# Patient Record
Sex: FEMALE | Race: BLACK OR AFRICAN AMERICAN | NOT HISPANIC OR LATINO | URBAN - METROPOLITAN AREA
[De-identification: names, ages, dates, MRNs, and addresses within clinical notes are randomized per-mention and may not be internally consistent; named-entity substitution may affect disease eponyms.]

---

## 2022-03-04 ENCOUNTER — INPATIENT (INPATIENT)
Facility: HOSPITAL | Age: 77
LOS: 1 days | Discharge: HOME | End: 2022-03-06
Attending: INTERNAL MEDICINE | Admitting: INTERNAL MEDICINE
Payer: MEDICARE

## 2022-03-04 VITALS
HEIGHT: 66 IN | RESPIRATION RATE: 18 BRPM | DIASTOLIC BLOOD PRESSURE: 123 MMHG | OXYGEN SATURATION: 97 % | SYSTOLIC BLOOD PRESSURE: 211 MMHG | WEIGHT: 151.9 LBS | TEMPERATURE: 98 F | HEART RATE: 68 BPM

## 2022-03-04 LAB
ALBUMIN SERPL ELPH-MCNC: 3.6 G/DL — SIGNIFICANT CHANGE UP (ref 3.3–5)
ALP SERPL-CCNC: 70 U/L — SIGNIFICANT CHANGE UP (ref 40–120)
ALT FLD-CCNC: 18 U/L — SIGNIFICANT CHANGE UP (ref 12–78)
ANION GAP SERPL CALC-SCNC: 1 MMOL/L — LOW (ref 5–17)
APTT BLD: 32.8 SEC — SIGNIFICANT CHANGE UP (ref 27.5–35.5)
AST SERPL-CCNC: 32 U/L — SIGNIFICANT CHANGE UP (ref 15–37)
BASOPHILS # BLD AUTO: 0.05 K/UL — SIGNIFICANT CHANGE UP (ref 0–0.2)
BASOPHILS NFR BLD AUTO: 0.7 % — SIGNIFICANT CHANGE UP (ref 0–2)
BILIRUB SERPL-MCNC: 0.7 MG/DL — SIGNIFICANT CHANGE UP (ref 0.2–1.2)
BUN SERPL-MCNC: 10 MG/DL — SIGNIFICANT CHANGE UP (ref 7–23)
CALCIUM SERPL-MCNC: 9.3 MG/DL — SIGNIFICANT CHANGE UP (ref 8.5–10.1)
CHLORIDE SERPL-SCNC: 106 MMOL/L — SIGNIFICANT CHANGE UP (ref 96–108)
CO2 SERPL-SCNC: 30 MMOL/L — SIGNIFICANT CHANGE UP (ref 22–31)
CREAT SERPL-MCNC: 1.02 MG/DL — SIGNIFICANT CHANGE UP (ref 0.5–1.3)
EGFR: 57 ML/MIN/1.73M2 — LOW
EOSINOPHIL # BLD AUTO: 0.01 K/UL — SIGNIFICANT CHANGE UP (ref 0–0.5)
EOSINOPHIL NFR BLD AUTO: 0.1 % — SIGNIFICANT CHANGE UP (ref 0–6)
FLUAV AG NPH QL: SIGNIFICANT CHANGE UP
FLUBV AG NPH QL: SIGNIFICANT CHANGE UP
GLUCOSE BLDC GLUCOMTR-MCNC: 113 MG/DL — HIGH (ref 70–99)
GLUCOSE BLDC GLUCOMTR-MCNC: 52 MG/DL — CRITICAL LOW (ref 70–99)
GLUCOSE SERPL-MCNC: 112 MG/DL — HIGH (ref 70–99)
HCT VFR BLD CALC: 38.9 % — SIGNIFICANT CHANGE UP (ref 34.5–45)
HGB BLD-MCNC: 12.1 G/DL — SIGNIFICANT CHANGE UP (ref 11.5–15.5)
IMM GRANULOCYTES NFR BLD AUTO: 0.1 % — SIGNIFICANT CHANGE UP (ref 0–1.5)
INR BLD: 0.97 RATIO — SIGNIFICANT CHANGE UP (ref 0.88–1.16)
LYMPHOCYTES # BLD AUTO: 1.75 K/UL — SIGNIFICANT CHANGE UP (ref 1–3.3)
LYMPHOCYTES # BLD AUTO: 24.5 % — SIGNIFICANT CHANGE UP (ref 13–44)
MCHC RBC-ENTMCNC: 23.4 PG — LOW (ref 27–34)
MCHC RBC-ENTMCNC: 31.1 G/DL — LOW (ref 32–36)
MCV RBC AUTO: 75.4 FL — LOW (ref 80–100)
MONOCYTES # BLD AUTO: 0.46 K/UL — SIGNIFICANT CHANGE UP (ref 0–0.9)
MONOCYTES NFR BLD AUTO: 6.5 % — SIGNIFICANT CHANGE UP (ref 2–14)
NEUTROPHILS # BLD AUTO: 4.85 K/UL — SIGNIFICANT CHANGE UP (ref 1.8–7.4)
NEUTROPHILS NFR BLD AUTO: 68.1 % — SIGNIFICANT CHANGE UP (ref 43–77)
NRBC # BLD: 0 /100 WBCS — SIGNIFICANT CHANGE UP (ref 0–0)
PLATELET # BLD AUTO: 222 K/UL — SIGNIFICANT CHANGE UP (ref 150–400)
POTASSIUM SERPL-MCNC: 4.3 MMOL/L — SIGNIFICANT CHANGE UP (ref 3.5–5.3)
POTASSIUM SERPL-SCNC: 4.3 MMOL/L — SIGNIFICANT CHANGE UP (ref 3.5–5.3)
PROT SERPL-MCNC: 7.6 GM/DL — SIGNIFICANT CHANGE UP (ref 6–8.3)
PROTHROM AB SERPL-ACNC: 11.5 SEC — SIGNIFICANT CHANGE UP (ref 10.5–13.4)
RBC # BLD: 5.16 M/UL — SIGNIFICANT CHANGE UP (ref 3.8–5.2)
RBC # FLD: 14.4 % — SIGNIFICANT CHANGE UP (ref 10.3–14.5)
SARS-COV-2 RNA SPEC QL NAA+PROBE: SIGNIFICANT CHANGE UP
SODIUM SERPL-SCNC: 137 MMOL/L — SIGNIFICANT CHANGE UP (ref 135–145)
TROPONIN I, HIGH SENSITIVITY RESULT: 7.3 NG/L — SIGNIFICANT CHANGE UP
WBC # BLD: 7.13 K/UL — SIGNIFICANT CHANGE UP (ref 3.8–10.5)
WBC # FLD AUTO: 7.13 K/UL — SIGNIFICANT CHANGE UP (ref 3.8–10.5)

## 2022-03-04 PROCEDURE — 99223 1ST HOSP IP/OBS HIGH 75: CPT

## 2022-03-04 PROCEDURE — 70496 CT ANGIOGRAPHY HEAD: CPT | Mod: 26,MA

## 2022-03-04 PROCEDURE — 99284 EMERGENCY DEPT VISIT MOD MDM: CPT

## 2022-03-04 PROCEDURE — 70498 CT ANGIOGRAPHY NECK: CPT | Mod: 26,MA

## 2022-03-04 PROCEDURE — 93010 ELECTROCARDIOGRAM REPORT: CPT

## 2022-03-04 RX ORDER — AMLODIPINE BESYLATE 2.5 MG/1
10 TABLET ORAL ONCE
Refills: 0 | Status: COMPLETED | OUTPATIENT
Start: 2022-03-04 | End: 2022-03-04

## 2022-03-04 RX ORDER — LABETALOL HCL 100 MG
10 TABLET ORAL ONCE
Refills: 0 | Status: COMPLETED | OUTPATIENT
Start: 2022-03-04 | End: 2022-03-04

## 2022-03-04 RX ORDER — HYDROCHLOROTHIAZIDE 25 MG
25 TABLET ORAL DAILY
Refills: 0 | Status: DISCONTINUED | OUTPATIENT
Start: 2022-03-04 | End: 2022-03-06

## 2022-03-04 RX ORDER — ATORVASTATIN CALCIUM 80 MG/1
40 TABLET, FILM COATED ORAL AT BEDTIME
Refills: 0 | Status: DISCONTINUED | OUTPATIENT
Start: 2022-03-04 | End: 2022-03-06

## 2022-03-04 RX ORDER — ASPIRIN/CALCIUM CARB/MAGNESIUM 324 MG
81 TABLET ORAL DAILY
Refills: 0 | Status: DISCONTINUED | OUTPATIENT
Start: 2022-03-04 | End: 2022-03-06

## 2022-03-04 RX ORDER — HYDRALAZINE HCL 50 MG
50 TABLET ORAL THREE TIMES A DAY
Refills: 0 | Status: DISCONTINUED | OUTPATIENT
Start: 2022-03-04 | End: 2022-03-06

## 2022-03-04 RX ORDER — HYDRALAZINE HCL 50 MG
10 TABLET ORAL ONCE
Refills: 0 | Status: COMPLETED | OUTPATIENT
Start: 2022-03-04 | End: 2022-03-04

## 2022-03-04 RX ORDER — AMLODIPINE BESYLATE 2.5 MG/1
10 TABLET ORAL DAILY
Refills: 0 | Status: DISCONTINUED | OUTPATIENT
Start: 2022-03-04 | End: 2022-03-06

## 2022-03-04 RX ADMIN — AMLODIPINE BESYLATE 10 MILLIGRAM(S): 2.5 TABLET ORAL at 18:15

## 2022-03-04 RX ADMIN — Medication 10 MILLIGRAM(S): at 21:13

## 2022-03-04 RX ADMIN — Medication 50 MILLIGRAM(S): at 19:41

## 2022-03-04 RX ADMIN — ATORVASTATIN CALCIUM 40 MILLIGRAM(S): 80 TABLET, FILM COATED ORAL at 19:40

## 2022-03-04 RX ADMIN — Medication 10 MILLIGRAM(S): at 17:30

## 2022-03-04 RX ADMIN — Medication 81 MILLIGRAM(S): at 19:41

## 2022-03-04 NOTE — H&P ADULT - ASSESSMENT
76 year old female no sig PMH presents to the ER for pre-syncope. Patient reports waking up this morning, felt dizziness, stood up from bed, tried making it downstairs to her granddaughter to tell her she didn't feel well, lowered self to the ground, reporting near syncope. Denies hitting head, LOC or use of blood thinners.     Plan:  Admit to tele for near-syncope. BP elevated in ER, HTN urgency. Labs WNL. Takes no meds at home, HR 62 in ER.      Added Norvasc 10 mg daily and Hydralazine 50 mg tid along with HCTZ daily.  Cartoid dopplers ordered as CTA notes bilateral ICA stenosis.  Refuses to wait for   brain MRI, can be done as outpatient.     Eager for discharge Saturday AM.     76 year old female no sig PMH presents to the ER for pre-syncope. Patient reports waking up this morning, felt dizziness, stood up from bed, tried making it downstairs to her granddaughter to tell her she didn't feel well, lowered self to the ground, reporting near syncope. Denies hitting head, LOC or use of blood thinners.     Plan:  Admit to tele for near-syncope. BP elevated in ER, HTN urgency. Labs WNL. Takes no meds at home, HR 62 in ER.      Added Norvasc 10 mg daily and Hydralazine 50 mg tid along with HCTZ daily.  Cartoid dopplers ordered as CTA notes bilateral ICA stenosis.  CT head no acute infarcts or bleeds. Refuses to wait for brain MRI, can be done as outpatient.     Eager for discharge Saturday AM.

## 2022-03-04 NOTE — ED ADULT NURSE NOTE - OBJECTIVE STATEMENT
per  pt c/o blurry vision with dizziness for 2 days, pt states she had syncopal episode this morning. Pt denies taking any BP meds. Pt is axo4, denies chest pain.

## 2022-03-04 NOTE — H&P ADULT - NSHPLABSRESULTS_GEN_ALL_CORE
LABS:                        12.1   7.13  )-----------( 222      ( 04 Mar 2022 17:00 )             38.9     03-04    137  |  106  |  10  ----------------------------<  112<H>  4.3   |  30  |  1.02    Ca    9.3      04 Mar 2022 17:00    TPro  7.6  /  Alb  3.6  /  TBili  0.7  /  DBili  x   /  AST  32  /  ALT  18  /  AlkPhos  70  03-04    PT/INR - ( 04 Mar 2022 17:00 )   PT: 11.5 sec;   INR: 0.97 ratio         PTT - ( 04 Mar 2022 17:00 )  PTT:32.8 sec        RADIOLOGY & ADDITIONAL TESTS:

## 2022-03-04 NOTE — PATIENT PROFILE ADULT - FALL HARM RISK - ATTEMPT OOB
Juliet Veliz:  Fernandez has been having worsening depression.  She does not think the Celexa has been as effective lately.  I strongly recommend regular follow-up with your office.  Patient and father were agreeable.  Thanks for caring for her!  Austin.  Austin Patiño MD 1/26/2022 9:20 AM  
No

## 2022-03-04 NOTE — ED ADULT NURSE REASSESSMENT NOTE - NS ED NURSE REASSESS COMMENT FT1
pt oneil down to 44 per tele monitor tech. strip printed out. pt assessed and pt is asymptomatic. Hospitalist paged and Dr. Campo notified. Per Dr. Campo, pt likely had a pause due to Lopressor administration. Pt to have dose of hydralazine to bring down BP. Per Dr. Campo, pt stable and is ready to move to 2D. Updated report given to RN on 2D. Will continue to monitor.

## 2022-03-04 NOTE — ED PROVIDER NOTE - OBJECTIVE STATEMENT
76y Female with no sig PMHx presents to the ER for syncope. Patient reports waking up this morning, felt dizziness, stood up from bed, tried making it downstairs to her granddaughter to tell her she didn't feel well, lowered self to the ground, reporting near syncope. Denies hitting head, LOC or blood thinners. Reports R eye blurry vision without pain that began month ago. Denies chest pain, sob, abdominal pain, n/v/d, one sided weakness, slurred speech, facial droop.

## 2022-03-04 NOTE — PATIENT PROFILE ADULT - FALL HARM RISK - HARM RISK INTERVENTIONS

## 2022-03-04 NOTE — ED ADULT NURSE NOTE - CHIEF COMPLAINT QUOTE
per EMS pt c/o blurry vision with dizziness for 2 days, pt states she had syncopal episode this morning. Pt denies taking any BP meds. case discuss with Dr. Vicente at triage

## 2022-03-04 NOTE — H&P ADULT - HISTORY OF PRESENT ILLNESS
76 year old female no sig PMH presents to the ER for syncope. Patient reports waking up this morning, felt dizziness, stood up from bed, tried making it downstairs to her granddaughter to tell her she didn't feel well, lowered self to the ground, reporting near syncope. Denies hitting head, LOC or use of blood thinners.     Denies chest pain, sob, abdominal pain, n/v/d, one sided weakness, slurred speech, facial droop or fevers.   76 year old female no sig PMH presents to the ER for pre-syncope. Patient reports waking up this morning, felt dizziness, stood up from bed, tried making it downstairs to her granddaughter to tell her she didn't feel well, lowered self to the ground, reporting near syncope. Denies hitting head, LOC or use of blood thinners.     Denies chest pain, sob, abdominal pain, n/v/d, one sided weakness, slurred speech, facial droop or fevers.   76 year old female no sig PMH presents to the ER for pre-syncope. Patient reports waking up this morning, felt dizziness, stood up from bed, tried making it downstairs to her granddaughter to tell her she didn't feel well, lowered self to the ground, reporting near syncope. Denies hitting head, LOC or use of blood thinners. BP initially in ER was 230/120, improved later on.      Denies chest pain, sob, abdominal pain, n/v/d, one sided weakness, slurred speech, facial droop or fevers.

## 2022-03-04 NOTE — H&P ADULT - NSHPPHYSICALEXAM_GEN_ALL_CORE
PHYSICAL EXAMINATION:  Vital Signs Last 24 Hrs  T(C): 36.9 (04 Mar 2022 15:16), Max: 36.9 (04 Mar 2022 15:16)  T(F): 98.5 (04 Mar 2022 15:16), Max: 98.5 (04 Mar 2022 15:16)  HR: 66 (04 Mar 2022 18:12) (66 - 68)  BP: 216/60 (04 Mar 2022 18:46) (211/123 - 234/66)  BP(mean): --  RR: 17 (04 Mar 2022 18:12) (17 - 18)  SpO2: 97% (04 Mar 2022 18:12) (97% - 97%)  CAPILLARY BLOOD GLUCOSE      POCT Blood Glucose.: 52 mg/dL (04 Mar 2022 15:42)      GENERAL: NAD, well-groomed, well-developed  HEAD:  atraumatic, normocephalic  EYES: sclera anicteric  ENMT: mucous membranes moist  NECK: supple, No JVD  CHEST/LUNG: clear to auscultation bilaterally; no rales, rhonchi, or wheezing b/l  HEART: normal S1, S2  ABDOMEN: BS+, soft, ND, NT   EXTREMITIES:  pulses palpable; no clubbing, cyanosis, or edema b/l LEs  NEURO: awake, alert, interactive; moves all extremities  SKIN: no rashes or lesions PHYSICAL EXAMINATION:  Vital Signs Last 24 Hrs  T(C): 36.9 (04 Mar 2022 15:16), Max: 36.9 (04 Mar 2022 15:16)  T(F): 98.5 (04 Mar 2022 15:16), Max: 98.5 (04 Mar 2022 15:16)  HR: 66 (04 Mar 2022 18:12) (66 - 68)  BP: 216/60 (04 Mar 2022 18:46) (211/123 - 234/66)  BP(mean): --  RR: 17 (04 Mar 2022 18:12) (17 - 18)  SpO2: 97% (04 Mar 2022 18:12) (97% - 97%)  CAPILLARY BLOOD GLUCOSE      POCT Blood Glucose.: 52 mg/dL (04 Mar 2022 15:42)      GENERAL: NAD, seen in ER, comfortable, no SOB or CP, CN intact, MP 5/5 bilaterally  HEAD:  atraumatic, normocephalic  EYES: sclera anicteric  ENMT: mucous membranes moist  NECK: supple, No JVD  CHEST/LUNG: clear to auscultation bilaterally; no rales, rhonchi, or wheezing b/l  HEART: normal S1, S2  ABDOMEN: BS+, soft, ND, NT   EXTREMITIES:  pulses palpable; no clubbing, cyanosis, or edema b/l LEs  NEURO: awake, alert, interactive; moves all extremities  SKIN: no rashes or lesions

## 2022-03-04 NOTE — ED ADULT TRIAGE NOTE - CHIEF COMPLAINT QUOTE
per EMS pt c/o blurry vision with dizziness for 2 days, pt states she had syncopal episode this morning. Pt denies taking any BP meds per EMS pt c/o blurry vision with dizziness for 2 days, pt states she had syncopal episode this morning. Pt denies taking any BP meds. case discuss with Dr. Vicente at triage

## 2022-03-04 NOTE — ED ADULT NURSE NOTE - NSIMPLEMENTINTERV_GEN_ALL_ED
Implemented All Fall Risk Interventions:  [place] to call system. Call bell, personal items and telephone within reach. Instruct patient to call for assistance. Room bathroom lighting operational. Non-slip footwear when patient is off stretcher. Physically safe environment: no spills, clutter or unnecessary equipment. Stretcher in lowest position, wheels locked, appropriate side rails in place. Provide visual cue, wrist band, yellow gown, etc. Monitor gait and stability. Monitor for mental status changes and reorient to person, place, and time. Review medications for side effects contributing to fall risk. Reinforce activity limits and safety measures with patient and family.

## 2022-03-04 NOTE — ED PROVIDER NOTE - CLINICAL SUMMARY MEDICAL DECISION MAKING FREE TEXT BOX
76y Female with no sig PMHx presents to the ER for syncope. Lightheadedness, near syncope without head strike or blood thinners. R sided blurry vision x few months. Denies one sided weakness, slurred speech or facial droop. BP elevated, FS 52. Concern for hypertensive emergency - will get labs, CTs, reassess. Dispo pending results.

## 2022-03-04 NOTE — ED PROVIDER NOTE - NS ED ROS FT
Constitutional: (-) Fever, (-) Chills  Skin: (-) Rashes  Eyes: (+) Visual changes, (-) Discharge, (-) Redness  Ears: (-) Hearing loss, (-)Tinnitus, (-) Ear pain  CV: (-) Chest pain, (-) Palpitations  Resp: (-) Cough, (-) Shortness of breath  GI: (-) Abdominal pain, (-) Nausea, (-) Vomiting, (-) Diarrhea  : (-) Dysuria, (-) Hematuria, (-) Increased frequency  MSK: (-) Myalgias, (-) Back pain, (-) Neck pain  Neuro: (+)Dizziness, (-) Loss of consciousness, (-) Headache

## 2022-03-04 NOTE — ED ADULT NURSE NOTE - ED STAT RN HANDOFF DETAILS 2
report given to Valeria TARANGO. RN endorsed to monitor BP closely and suggested to request additional medications from hospitalist for BP control.

## 2022-03-05 LAB
A1C WITH ESTIMATED AVERAGE GLUCOSE RESULT: 5.7 % — HIGH (ref 4–5.6)
CHOLEST SERPL-MCNC: 152 MG/DL — SIGNIFICANT CHANGE UP
ESTIMATED AVERAGE GLUCOSE: 117 MG/DL — HIGH (ref 68–114)
HCV AB S/CO SERPL IA: 0.1 S/CO — SIGNIFICANT CHANGE UP (ref 0–0.99)
HCV AB SERPL-IMP: SIGNIFICANT CHANGE UP
HDLC SERPL-MCNC: 79 MG/DL — SIGNIFICANT CHANGE UP
LIPID PNL WITH DIRECT LDL SERPL: 65 MG/DL — SIGNIFICANT CHANGE UP
NON HDL CHOLESTEROL: 74 MG/DL — SIGNIFICANT CHANGE UP
TRIGL SERPL-MCNC: 43 MG/DL — SIGNIFICANT CHANGE UP

## 2022-03-05 PROCEDURE — 99232 SBSQ HOSP IP/OBS MODERATE 35: CPT

## 2022-03-05 PROCEDURE — 93880 EXTRACRANIAL BILAT STUDY: CPT | Mod: 26

## 2022-03-05 RX ADMIN — ATORVASTATIN CALCIUM 40 MILLIGRAM(S): 80 TABLET, FILM COATED ORAL at 21:43

## 2022-03-05 RX ADMIN — Medication 25 MILLIGRAM(S): at 06:08

## 2022-03-05 RX ADMIN — Medication 50 MILLIGRAM(S): at 21:41

## 2022-03-05 RX ADMIN — Medication 50 MILLIGRAM(S): at 14:13

## 2022-03-05 RX ADMIN — Medication 50 MILLIGRAM(S): at 06:07

## 2022-03-05 RX ADMIN — Medication 81 MILLIGRAM(S): at 11:12

## 2022-03-05 NOTE — PROGRESS NOTE ADULT - SUBJECTIVE AND OBJECTIVE BOX
Patient is a 76y old  Female who presents with a chief complaint of HTN urgency and near-syncope. (04 Mar 2022 18:52)      INTERVAL HPI/OVERNIGHT EVENTS:    Some occasional mild dizziness, but tolerable.     REVIEW OF SYSTEMS:   Remaining ROS negative    MEDICATIONS  (STANDING):  amLODIPine   Tablet 10 milliGRAM(s) Oral daily  aspirin enteric coated 81 milliGRAM(s) Oral daily  atorvastatin 40 milliGRAM(s) Oral at bedtime  hydrALAZINE 50 milliGRAM(s) Oral three times a day  hydrochlorothiazide 25 milliGRAM(s) Oral daily    MEDICATIONS  (PRN):      Allergies    No Known Allergies    Intolerances        Vital Signs Last 24 Hrs  T(C): 36.6 (05 Mar 2022 11:04), Max: 37.4 (05 Mar 2022 05:02)  T(F): 97.9 (05 Mar 2022 11:04), Max: 99.3 (05 Mar 2022 05:02)  HR: 64 (05 Mar 2022 13:38) (53 - 73)  BP: 164/61 (05 Mar 2022 13:38) (114/81 - 234/66)  BP(mean): --  RR: 19 (05 Mar 2022 11:04) (17 - 20)  SpO2: 100% (05 Mar 2022 11:04) (95% - 100%)    PHYSICAL EXAM:  GENERAL: NAD  HEAD:  Atraumatic, Normocephalic  EYES: EOMI, PERRLA, conjunctiva and sclera clear  ENT: O/P Clear  NECK: Supple, No JVD; ? mild bruit on R  NERVOUS SYSTEM:  No focal deficits  CHEST/LUNG: Clear to percussion bilaterally; No rales, rhonchi, wheezing  HEART: Regular rate and rhythm; No murmurs, rubs, or gallops  ABDOMEN: Soft, Nontender, Nondistended; Bowel sounds present  EXTREMITIES:  2+ Peripheral Pulses, No clubbing, cyanosis, or edema  SKIN: No rashes or lesions    LABS:                        12.1   7.13  )-----------( 222      ( 04 Mar 2022 17:00 )             38.9     03-04    137  |  106  |  10  ----------------------------<  112<H>  4.3   |  30  |  1.02    Ca    9.3      04 Mar 2022 17:00    TPro  7.6  /  Alb  3.6  /  TBili  0.7  /  DBili  x   /  AST  32  /  ALT  18  /  AlkPhos  70  03-04    PT/INR - ( 04 Mar 2022 17:00 )   PT: 11.5 sec;   INR: 0.97 ratio         PTT - ( 04 Mar 2022 17:00 )  PTT:32.8 sec    CAPILLARY BLOOD GLUCOSE      POCT Blood Glucose.: 113 mg/dL (04 Mar 2022 19:33)  POCT Blood Glucose.: 52 mg/dL (04 Mar 2022 15:42)      RADIOLOGY & ADDITIONAL TESTS:    Imaging Personally Reviewed:  [ ] YES  [ ] NO    Consultant(s) Notes Reviewed:  [ ] YES  [ ] NO    Care Discussed with Consultants/Other Providers [ ] YES  [ ] NO

## 2022-03-05 NOTE — PROGRESS NOTE ADULT - ASSESSMENT
76 year old female no sig PMH presents to the ER for pre-syncope. Patient reports waking up this morning, felt dizziness, stood up from bed, tried making it downstairs to her granddaughter to tell her she didn't feel well, lowered self to the ground, reporting near syncope. Denies hitting head, LOC or use of blood thinners.     Plan:  Admit to tele for near-syncope. BP elevated in ER, HTN urgency. Labs WNL. Takes no meds at home, HR 62 in ER.      Added Norvasc 10 mg daily and Hydralazine 50 mg tid along with HCTZ daily.  Cartoid dopplers ordered as CTA notes bilateral ICA stenosis.  CT head no acute infarcts or bleeds. Refuses to wait for brain MRI, can be done as outpatient.     Eager for discharge Saturday AM.     76 year old female no sig PMH presents to the ER for pre-syncope. Patient reports waking up this morning, felt dizziness, stood up from bed, tried making it downstairs to her granddaughter to tell her she didn't feel well, lowered self to the ground, reporting near syncope. Denies hitting head, LOC or use of blood thinners.     Hypertensive urgency    Amlodipine, Hydralazine, HCTZ added    Some improvement in both BP and in symptoms    Near-syncope    Monitoring on Tele; nothing significant so far    May be due to the HTN itself, or due to the carotid issues.    Severe carotid stenosis    Seen on angio    Ultrasound done; radiologist reading pending.    Have asked vascular surgery (Kendell) to see to help determine urgency of treatment    CT of brain looks OK; pt refuses MRI.    Disp: Pt very eager for discharge, but agrees to wait for vascular surgery eval.

## 2022-03-06 ENCOUNTER — TRANSCRIPTION ENCOUNTER (OUTPATIENT)
Age: 77
End: 2022-03-06

## 2022-03-06 VITALS — WEIGHT: 129.85 LBS

## 2022-03-06 PROCEDURE — 93306 TTE W/DOPPLER COMPLETE: CPT | Mod: 26

## 2022-03-06 PROCEDURE — 99238 HOSP IP/OBS DSCHRG MGMT 30/<: CPT

## 2022-03-06 RX ORDER — ASPIRIN/CALCIUM CARB/MAGNESIUM 324 MG
1 TABLET ORAL
Qty: 100 | Refills: 0
Start: 2022-03-06

## 2022-03-06 RX ORDER — HYDRALAZINE HCL 50 MG
1 TABLET ORAL
Qty: 90 | Refills: 0
Start: 2022-03-06

## 2022-03-06 RX ORDER — AMLODIPINE BESYLATE 2.5 MG/1
1 TABLET ORAL
Qty: 30 | Refills: 0
Start: 2022-03-06

## 2022-03-06 RX ORDER — ATORVASTATIN CALCIUM 80 MG/1
1 TABLET, FILM COATED ORAL
Qty: 30 | Refills: 0
Start: 2022-03-06

## 2022-03-06 RX ADMIN — Medication 25 MILLIGRAM(S): at 06:26

## 2022-03-06 RX ADMIN — Medication 50 MILLIGRAM(S): at 06:16

## 2022-03-06 RX ADMIN — Medication 81 MILLIGRAM(S): at 11:05

## 2022-03-06 NOTE — DIETITIAN INITIAL EVALUATION ADULT. - PERTINENT LABORATORY DATA
03-04 Na137 mmol/L Glu 112 mg/dL<H> K+ 4.3 mmol/L Cr  1.02 mg/dL BUN 10 mg/dL 03-04 Alb 3.6 g/dL 03-05 Chol 152 mg/dL LDL --    HDL 79 mg/dL Trig 43 mg/dL03-04 ALT 18 U/L AST 32 U/L Alkaline Phosphatase 70 [id number] @ 12:22 A1C 5.7

## 2022-03-06 NOTE — DISCHARGE NOTE PROVIDER - HOSPITAL COURSE
76 year old female no sig PMH presents to the ER for pre-syncope. Patient reports waking up this morning, felt dizziness, stood up from bed, tried making it downstairs to her granddaughter to tell her she didn't feel well, lowered self to the ground, reporting near syncope. Denies hitting head, LOC or use of blood thinners.     Hypertensive urgency    Amlodipine, Hydralazine added    Echo suggests her HTN is long-standing    Moderate improvement in both BP and in symptoms    Near-syncope    Monitored on Tele; nothing significant found    May be due to the HTN itself, or due to the carotid issues, however in light of the Ultrasound findings, probably all due to HTN    Echo showed some concentric hypertrophy, but good systolic function    "Severe" carotid stenosis    Seen on angio    Ultrasound however shows 50-69% B/L    CT of brain looks OK; pt refuses MRI.    ASA and Lipitor added.    Lipid panel, however, not bad w/ LDL = 65; HDL = 79    Was recommended pt f/u w/ Dr. Rdz (vascular) in the office

## 2022-03-06 NOTE — DIETITIAN INITIAL EVALUATION ADULT. - ORAL INTAKE PTA/DIET HISTORY
PTA pt had not been cooking for herself. Her granddaughter was doing the cooking / food shopping. No diet restrictions.

## 2022-03-06 NOTE — DISCHARGE NOTE NURSING/CASE MANAGEMENT/SOCIAL WORK - NSDCPEFALRISK_GEN_ALL_CORE
For information on Fall & Injury Prevention, visit: [email or web address] OR  [email or web address] OR  https://www.cdc.gov/steadi/patient.html

## 2022-03-06 NOTE — DISCHARGE NOTE PROVIDER - PROVIDER TOKENS
PROVIDER:[TOKEN:[[id number]],FOLLOWUP:[1 week]],PROVIDER:[TOKEN:[[id number]],FOLLOWUP:[2 weeks]]

## 2022-03-06 NOTE — DIETITIAN INITIAL EVALUATION ADULT. - PERTINENT MEDS FT
MEDICATIONS  (STANDING):  amLODIPine   Tablet 10 milliGRAM(s) Oral daily  aspirin enteric coated 81 milliGRAM(s) Oral daily  atorvastatin 40 milliGRAM(s) Oral at bedtime  hydrALAZINE 50 milliGRAM(s) Oral three times a day  hydrochlorothiazide 25 milliGRAM(s) Oral daily    MEDICATIONS  (PRN):

## 2022-03-06 NOTE — DISCHARGE NOTE PROVIDER - CARE PROVIDER_API CALL
Jani Mendez [facility]  [address] [place] [address]  Phone: [phone]  Fax: [phone]  Follow Up Time: 1 week    Mamadou Rdz)  Surgery  [address]  [place], [place] [id number]  Phone: [phone]  Fax: [phone]  Follow Up Time: 2 weeks

## 2022-03-06 NOTE — DIETITIAN INITIAL EVALUATION ADULT. - EDUCATION DIETARY MODIFICATIONS
teach back/(2) meets goals/outcomes/verbalization teach back/(1) partially meets; needs review/practice/verbalization

## 2022-03-06 NOTE — DISCHARGE NOTE PROVIDER - NSDCMRMEDTOKEN_GEN_ALL_CORE_FT
amLODIPine 10 mg oral tablet: 1 tab(s) orally once a day  aspirin 81 mg oral delayed release tablet: 1 tab(s) orally once a day  atorvastatin 40 mg oral tablet: 1 tab(s) orally once a day (at bedtime)  hydrALAZINE 50 mg oral tablet: 1 tab(s) orally 3 times a day

## 2022-03-06 NOTE — DISCHARGE NOTE NURSING/CASE MANAGEMENT/SOCIAL WORK - PATIENT PORTAL LINK FT
You can access the FollowMyHealth Patient Portal offered by [facility] by registering at the following website: [email or web address]. By joining [facility]’s FollowMyHealth portal, you will also be able to view your health information using other applications (apps) compatible with our system.

## 2022-03-06 NOTE — DISCHARGE NOTE PROVIDER - NSDCCPCAREPLAN_GEN_ALL_CORE_FT
PRINCIPAL DISCHARGE DIAGNOSIS  Diagnosis: Uncontrolled hypertension  Assessment and Plan of Treatment:       SECONDARY DISCHARGE DIAGNOSES  Diagnosis: Pre-syncope  Assessment and Plan of Treatment:

## 2022-03-09 DIAGNOSIS — I16.0 HYPERTENSIVE URGENCY: ICD-10-CM

## 2022-03-09 DIAGNOSIS — I65.23 OCCLUSION AND STENOSIS OF BILATERAL CAROTID ARTERIES: ICD-10-CM

## 2022-03-09 DIAGNOSIS — I10 ESSENTIAL (PRIMARY) HYPERTENSION: ICD-10-CM

## 2022-03-24 PROBLEM — Z00.00 ENCOUNTER FOR PREVENTIVE HEALTH EXAMINATION: Status: ACTIVE | Noted: 2022-03-24

## 2022-03-25 ENCOUNTER — APPOINTMENT (OUTPATIENT)
Dept: VASCULAR SURGERY | Facility: CLINIC | Age: 77
End: 2022-03-25
Payer: MEDICARE

## 2022-03-25 VITALS
WEIGHT: 152 LBS | DIASTOLIC BLOOD PRESSURE: 56 MMHG | HEART RATE: 77 BPM | TEMPERATURE: 98.2 F | SYSTOLIC BLOOD PRESSURE: 125 MMHG | OXYGEN SATURATION: 98 % | HEIGHT: 66.5 IN | BODY MASS INDEX: 24.14 KG/M2

## 2022-03-25 DIAGNOSIS — Z83.3 FAMILY HISTORY OF DIABETES MELLITUS: ICD-10-CM

## 2022-03-25 DIAGNOSIS — Z86.73 PERSONAL HISTORY OF TRANSIENT ISCHEMIC ATTACK (TIA), AND CEREBRAL INFARCTION W/OUT RESIDUAL DEFICITS: ICD-10-CM

## 2022-03-25 PROCEDURE — 99214 OFFICE O/P EST MOD 30 MIN: CPT

## 2022-03-25 RX ORDER — AMLODIPINE BESYLATE 10 MG/1
10 TABLET ORAL
Refills: 0 | Status: ACTIVE | COMMUNITY

## 2022-03-25 RX ORDER — HYDRALAZINE HYDROCHLORIDE 50 MG/1
50 TABLET ORAL
Refills: 0 | Status: ACTIVE | COMMUNITY

## 2022-03-25 RX ORDER — ASPIRIN 81 MG
81 TABLET, DELAYED RELEASE (ENTERIC COATED) ORAL
Refills: 0 | Status: ACTIVE | COMMUNITY

## 2022-03-25 RX ORDER — ATORVASTATIN CALCIUM 40 MG/1
40 TABLET, FILM COATED ORAL
Refills: 0 | Status: ACTIVE | COMMUNITY

## 2022-03-25 NOTE — HISTORY OF PRESENT ILLNESS
[FreeTextEntry1] : This is a 76-year-old female who has come to the office for the follow-up upon discharge from the [facility] in early March.  The patient was admitted to the hospital with history of syncope and on work-up with carotid duplex and CT angiogram of the neck was found to have bilateral carotid artery stenosis and was advised to follow-up with me in the office.  I had seen the patient in the hospital.\par The patient was diagnosed with uncontrolled hypertension and syncope and no other abnormality was found as per the patient and patient's granddaughter who was accompanying the patient.\par The carotid duplex had shown 50 to 69% stenosis of bilateral internal carotid artery.  Based on the patient's signs and symptoms and presentation this findings appear to be asymptomatic the patient was discharged on aspirin which she is taking daily.\par At this stage patient has absolutely no symptoms no dizziness no history of CVA in the past or at present time no headaches or no other signs symptoms of elevated hypertension.\par

## 2022-03-25 NOTE — ASSESSMENT
[FreeTextEntry1] : Patient with 50 to 69% stenosis of bilateral carotid arteries on aspirin and no history of CVA or TIAs in the past.  Patient still continues to smoke.

## 2022-03-25 NOTE — PHYSICAL EXAM
[JVD] : no jugular venous distention  [Normal Thyroid] : the thyroid was normal [Normal Breath Sounds] : Normal breath sounds [Normal Heart Sounds] : normal heart sounds [Normal Rate and Rhythm] : normal rate and rhythm [Right Carotid Bruit] : no bruit heard over the right carotid [Left Carotid Bruit] : no bruit heard over the left carotid [Right Femoral Bruit] : no bruit heard over the right femoral artery [Left Femoral Bruit] : no bruit heard over the left femoral artery [2+] : right 2+ [1+] : left 1+ [Ankle Swelling (On Exam)] : not present [Varicose Veins Of Lower Extremities] : not present [] : not present [Abdomen Masses] : No abdominal masses [Abdomen Tenderness] : ~T ~M No abdominal tenderness [No Rash or Lesion] : No rash or lesion [de-identified] : Well-built well-nourished [de-identified] : Within normal limits

## 2022-04-07 ENCOUNTER — EMERGENCY (EMERGENCY)
Facility: HOSPITAL | Age: 77
LOS: 0 days | Discharge: HOME | End: 2022-04-08
Attending: EMERGENCY MEDICINE
Payer: MEDICARE

## 2022-04-07 VITALS
HEART RATE: 65 BPM | TEMPERATURE: 98 F | OXYGEN SATURATION: 99 % | WEIGHT: 151.9 LBS | SYSTOLIC BLOOD PRESSURE: 131 MMHG | DIASTOLIC BLOOD PRESSURE: 63 MMHG | HEIGHT: 66 IN | RESPIRATION RATE: 18 BRPM

## 2022-04-07 DIAGNOSIS — E78.5 HYPERLIPIDEMIA, UNSPECIFIED: ICD-10-CM

## 2022-04-07 DIAGNOSIS — R60.9 EDEMA, UNSPECIFIED: ICD-10-CM

## 2022-04-07 DIAGNOSIS — Z79.82 LONG TERM (CURRENT) USE OF ASPIRIN: ICD-10-CM

## 2022-04-07 DIAGNOSIS — I10 ESSENTIAL (PRIMARY) HYPERTENSION: ICD-10-CM

## 2022-04-07 DIAGNOSIS — L50.9 URTICARIA, UNSPECIFIED: ICD-10-CM

## 2022-04-07 DIAGNOSIS — F17.200 NICOTINE DEPENDENCE, UNSPECIFIED, UNCOMPLICATED: ICD-10-CM

## 2022-04-07 DIAGNOSIS — D64.9 ANEMIA, UNSPECIFIED: ICD-10-CM

## 2022-04-07 LAB
BASOPHILS # BLD AUTO: 0.03 K/UL — SIGNIFICANT CHANGE UP (ref 0–0.2)
BASOPHILS NFR BLD AUTO: 0.5 % — SIGNIFICANT CHANGE UP (ref 0–2)
EOSINOPHIL # BLD AUTO: 0.22 K/UL — SIGNIFICANT CHANGE UP (ref 0–0.5)
EOSINOPHIL NFR BLD AUTO: 3.5 % — SIGNIFICANT CHANGE UP (ref 0–6)
HCT VFR BLD CALC: 25.1 % — LOW (ref 34.5–45)
HGB BLD-MCNC: 7.8 G/DL — LOW (ref 11.5–15.5)
IMM GRANULOCYTES NFR BLD AUTO: 0.3 % — SIGNIFICANT CHANGE UP (ref 0–1.5)
LYMPHOCYTES # BLD AUTO: 0.75 K/UL — LOW (ref 1–3.3)
LYMPHOCYTES # BLD AUTO: 11.9 % — LOW (ref 13–44)
MCHC RBC-ENTMCNC: 24.5 PG — LOW (ref 27–34)
MCHC RBC-ENTMCNC: 31.1 G/DL — LOW (ref 32–36)
MCV RBC AUTO: 78.9 FL — LOW (ref 80–100)
MONOCYTES # BLD AUTO: 0.45 K/UL — SIGNIFICANT CHANGE UP (ref 0–0.9)
MONOCYTES NFR BLD AUTO: 7.2 % — SIGNIFICANT CHANGE UP (ref 2–14)
NEUTROPHILS # BLD AUTO: 4.81 K/UL — SIGNIFICANT CHANGE UP (ref 1.8–7.4)
NEUTROPHILS NFR BLD AUTO: 76.6 % — SIGNIFICANT CHANGE UP (ref 43–77)
NRBC # BLD: 0 /100 WBCS — SIGNIFICANT CHANGE UP (ref 0–0)
PLATELET # BLD AUTO: 241 K/UL — SIGNIFICANT CHANGE UP (ref 150–400)
RBC # BLD: 3.18 M/UL — LOW (ref 3.8–5.2)
RBC # FLD: 13.8 % — SIGNIFICANT CHANGE UP (ref 10.3–14.5)
WBC # BLD: 6.28 K/UL — SIGNIFICANT CHANGE UP (ref 3.8–10.5)
WBC # FLD AUTO: 6.28 K/UL — SIGNIFICANT CHANGE UP (ref 3.8–10.5)

## 2022-04-07 PROCEDURE — 99285 EMERGENCY DEPT VISIT HI MDM: CPT

## 2022-04-07 PROCEDURE — 71045 X-RAY EXAM CHEST 1 VIEW: CPT | Mod: 26

## 2022-04-07 RX ORDER — DIPHENHYDRAMINE HCL 50 MG
50 CAPSULE ORAL ONCE
Refills: 0 | Status: COMPLETED | OUTPATIENT
Start: 2022-04-07 | End: 2022-04-07

## 2022-04-07 RX ORDER — FAMOTIDINE 10 MG/ML
20 INJECTION INTRAVENOUS ONCE
Refills: 0 | Status: COMPLETED | OUTPATIENT
Start: 2022-04-07 | End: 2022-04-07

## 2022-04-07 RX ADMIN — FAMOTIDINE 20 MILLIGRAM(S): 10 INJECTION INTRAVENOUS at 23:39

## 2022-04-07 NOTE — ED PROVIDER NOTE - PATIENT PORTAL LINK FT
You can access the FollowMyHealth Patient Portal offered by [facility] by registering at the following website: [email or web address]. By joining [facility]’s FollowMyHealth portal, you will also be able to view your health information using other applications (apps) compatible with our system.

## 2022-04-07 NOTE — ED PROVIDER NOTE - PHYSICAL EXAMINATION
Gen: Alert, NAD, well appearing  Head: NC, AT, PERRL, EOMI, normal lids/conjunctiva  ENT: normal hearing, patent oropharynx without erythema/exudate, uvula midline  Neck: +supple, no tenderness/meningismus/JVD, +Trachea midline  Pulm: Bilateral BS, normal resp effort, no wheeze/stridor/retractions  CV: RRR, no M/R/G, +dist pulses  Abd: soft, NT/ND, Negative Burr signs, +BS, no palpable masses  Mskel: +BL pitting edema, no erythema/cyanosis  Skin: diffuse hives, warm/dry  Neuro: AAOx3, no apparent sensory/motor deficits, coordination intact

## 2022-04-07 NOTE — ED PROVIDER NOTE - CARE PLAN
1 Principal Discharge DX:	Hives  Secondary Diagnosis:	Peripheral edema  Secondary Diagnosis:	Anemia

## 2022-04-07 NOTE — ED PROVIDER NOTE - CLINICAL SUMMARY MEDICAL DECISION MAKING FREE TEXT BOX
Patient with hives and peripheral edema.  VSS.  Based on work up, patient not in heart failure, no LE DVTs.  Hives resolved.  Labs show depressed Hgb, patient asymptomatic.  D/w family that LE edema may be result of medication reaction vs third spacing from anemia.  Heme occult negative.  Patient has prior h/o anemia, noncompliant with iron.  Advised to resume iron, will dc with benadryl, patient asked to stop her medications for now until her symptoms resolve completely and can reintroduce meds one at a time.  Patient already has follow up with her PMD on Monday.  Will advise discussion colonscopy with PMD for screening, and give follow up to allergist. Discussed results and outcome of today's visit with the patient/family, copy of results given with discharge.  Patient advised to arrange bhardwaj follow up with their PMD and/or any provided referral(s) within the next few days and are cautioned to return to the Emergency Department for any worsening symptoms.  Patient advised that their doctor may call  to follow up on the specific results of the tests performed today in the emergency department.   Patient appears well on discharge. Patient with hives and peripheral edema.  VSS.  Based on work up, patient not in heart failure, no LE DVTs.  Hives resolved.  Labs show depressed Hgb, patient asymptomatic.  D/w family that LE edema may be result of medication reaction vs third spacing from anemia.  Heme occult negative.  Patient has prior h/o anemia, noncompliant with iron.  Advised to resume iron, will dc with benadryl, patient asked to stop her medications for now until her symptoms resolve completely and can reintroduce meds one at a time, also however closely monitor BP for significant elevation (>170/100).  Patient already has follow up with her PMD on Monday.  Will advise discussion colonscopy with PMD for screening, and give follow up to allergist. Discussed results and outcome of today's visit with the patient/family, copy of results given with discharge.  Patient advised to arrange bhardwaj follow up with their PMD and/or any provided referral(s) within the next few days and are cautioned to return to the Emergency Department for any worsening symptoms.  Patient advised that their doctor may call  to follow up on the specific results of the tests performed today in the emergency department.   Patient appears well on discharge.

## 2022-04-07 NOTE — ED ADULT TRIAGE NOTE - CHIEF COMPLAINT QUOTE
Pt AOx4 states she thinks she is having an allergic reaction to unknown source due to b/l leg itching for the past 2 days. Pt denies f/c/n/v/d, cp, sob, tongue swelling, difficulty breathing. Pt speaking clearly in full sentences. Legs appear edematous in triage. PMHX of HTN, HLD. Pt states no known allergies.

## 2022-04-07 NOTE — ED ADULT NURSE NOTE - OBJECTIVE STATEMENT
Pt received on stretcher, A&Ox4, no labored breathing noted. Pt c/o BLL swelling and leg itching that Pt received on stretcher, A&Ox4, no labored breathing noted. Pt c/o BLL swelling and leg itching that started yesterday. Pt has hives on her arms and c/o arm itching as well, denies any difficulty breathing or swallowing.

## 2022-04-07 NOTE — ED PROVIDER NOTE - PROGRESS NOTE DETAILS
On discharge was asked to look at clean appearing scabbed over wound of left thigh, patient reported a blister which resolved, no additional blisters, appears uninfected, advised to keep clean, apply bacitracin.

## 2022-04-07 NOTE — ED PROVIDER NOTE - OBJECTIVE STATEMENT
Pertinent PMH/PSH/FHx/SHx and Review of Systems contained within:  Patient presents to the ED for allergic reaction and peripheral edema.  Patient reports diffuse hives on arms and legs which she noticed 2 days ago along with BL leg swelling.  Denies calf pain, chest pain, sob, tongue swelling, throat tightness or fevers.  She was admitted 1 month ago for uncontrolled BP and syncope, started on hydralazine, amlodipine, asa, and atorvastatin. Her last dose was today. She denies any other new products or cosmetics.     Relevant PMHx/SHx/SOCHx/FAMH:  HTN, HLD, carotid artery stenosis  +Smoker, denies use of other illict drugs or h/o alchol abuse    ROS: No fever/chills, No headache/photophobia/eye pain/changes in vision, No ear pain/sore throat/dysphagia, No chest pain/palpitations, no SOB/cough/wheeze/stridor, No abdominal pain, No N/V/D/melena, no dysuria/frequency/discharge, No neck/back pain, no changes in neurological status/function.

## 2022-04-08 VITALS
RESPIRATION RATE: 18 BRPM | DIASTOLIC BLOOD PRESSURE: 69 MMHG | OXYGEN SATURATION: 100 % | SYSTOLIC BLOOD PRESSURE: 144 MMHG | HEART RATE: 63 BPM | TEMPERATURE: 98 F

## 2022-04-08 LAB
ALBUMIN SERPL ELPH-MCNC: 3.2 G/DL — LOW (ref 3.3–5)
ALP SERPL-CCNC: 108 U/L — SIGNIFICANT CHANGE UP (ref 40–120)
ALT FLD-CCNC: 39 U/L — SIGNIFICANT CHANGE UP (ref 12–78)
ANION GAP SERPL CALC-SCNC: 7 MMOL/L — SIGNIFICANT CHANGE UP (ref 5–17)
AST SERPL-CCNC: 25 U/L — SIGNIFICANT CHANGE UP (ref 15–37)
BILIRUB SERPL-MCNC: 0.6 MG/DL — SIGNIFICANT CHANGE UP (ref 0.2–1.2)
BUN SERPL-MCNC: 10 MG/DL — SIGNIFICANT CHANGE UP (ref 7–23)
CALCIUM SERPL-MCNC: 8.3 MG/DL — LOW (ref 8.5–10.1)
CHLORIDE SERPL-SCNC: 109 MMOL/L — HIGH (ref 96–108)
CO2 SERPL-SCNC: 23 MMOL/L — SIGNIFICANT CHANGE UP (ref 22–31)
CREAT SERPL-MCNC: 1.11 MG/DL — SIGNIFICANT CHANGE UP (ref 0.5–1.3)
EGFR: 52 ML/MIN/1.73M2 — LOW
GLUCOSE SERPL-MCNC: 126 MG/DL — HIGH (ref 70–99)
NT-PROBNP SERPL-SCNC: 1306 PG/ML — HIGH (ref 0–450)
OB PNL STL: NEGATIVE — SIGNIFICANT CHANGE UP
POTASSIUM SERPL-MCNC: 3.9 MMOL/L — SIGNIFICANT CHANGE UP (ref 3.5–5.3)
POTASSIUM SERPL-SCNC: 3.9 MMOL/L — SIGNIFICANT CHANGE UP (ref 3.5–5.3)
PROT SERPL-MCNC: 6.7 GM/DL — SIGNIFICANT CHANGE UP (ref 6–8.3)
SODIUM SERPL-SCNC: 139 MMOL/L — SIGNIFICANT CHANGE UP (ref 135–145)
TROPONIN I, HIGH SENSITIVITY RESULT: 4.7 NG/L — SIGNIFICANT CHANGE UP

## 2022-04-08 PROCEDURE — 93970 EXTREMITY STUDY: CPT | Mod: 26

## 2022-04-08 RX ORDER — FERROUS SULFATE 325(65) MG
325 TABLET ORAL ONCE
Refills: 0 | Status: COMPLETED | OUTPATIENT
Start: 2022-04-08 | End: 2022-04-08

## 2022-04-08 RX ORDER — SENNOSIDES/DOCUSATE SODIUM 8.6MG-50MG
2 TABLET ORAL
Qty: 60 | Refills: 0
Start: 2022-04-08 | End: 2022-05-07

## 2022-04-08 RX ORDER — DIPHENHYDRAMINE HCL 50 MG
1 CAPSULE ORAL
Qty: 9 | Refills: 0
Start: 2022-04-08 | End: 2022-04-10

## 2022-04-08 RX ORDER — BACITRACIN ZINC 500 UNIT/G
1 OINTMENT IN PACKET (EA) TOPICAL ONCE
Refills: 0 | Status: COMPLETED | OUTPATIENT
Start: 2022-04-08 | End: 2022-04-08

## 2022-04-08 RX ORDER — FERROUS FUMARATE 350(115)MG
1 TABLET ORAL
Qty: 30 | Refills: 0
Start: 2022-04-08 | End: 2022-05-07

## 2022-04-08 RX ADMIN — Medication 325 MILLIGRAM(S): at 03:20

## 2022-04-08 RX ADMIN — Medication 1 APPLICATION(S): at 03:47

## 2022-04-08 RX ADMIN — Medication 50 MILLIGRAM(S): at 03:20

## 2022-05-24 ENCOUNTER — INPATIENT (INPATIENT)
Facility: HOSPITAL | Age: 77
LOS: 3 days | Discharge: HOME | End: 2022-05-28
Attending: INTERNAL MEDICINE | Admitting: INTERNAL MEDICINE
Payer: MEDICARE

## 2022-05-24 VITALS
HEART RATE: 47 BPM | HEIGHT: 66 IN | SYSTOLIC BLOOD PRESSURE: 162 MMHG | TEMPERATURE: 98 F | DIASTOLIC BLOOD PRESSURE: 76 MMHG | RESPIRATION RATE: 19 BRPM | OXYGEN SATURATION: 100 % | WEIGHT: 139.99 LBS

## 2022-05-24 LAB
ALBUMIN SERPL ELPH-MCNC: 3.3 G/DL — SIGNIFICANT CHANGE UP (ref 3.3–5)
ALP SERPL-CCNC: 88 U/L — SIGNIFICANT CHANGE UP (ref 40–120)
ALT FLD-CCNC: 17 U/L — SIGNIFICANT CHANGE UP (ref 12–78)
ANION GAP SERPL CALC-SCNC: 11 MMOL/L — SIGNIFICANT CHANGE UP (ref 5–17)
ANISOCYTOSIS BLD QL: SLIGHT — SIGNIFICANT CHANGE UP
APTT BLD: 29.7 SEC — SIGNIFICANT CHANGE UP (ref 27.5–35.5)
AST SERPL-CCNC: 23 U/L — SIGNIFICANT CHANGE UP (ref 15–37)
BASOPHILS # BLD AUTO: 0.03 K/UL — SIGNIFICANT CHANGE UP (ref 0–0.2)
BASOPHILS NFR BLD AUTO: 0.8 % — SIGNIFICANT CHANGE UP (ref 0–2)
BILIRUB SERPL-MCNC: 0.8 MG/DL — SIGNIFICANT CHANGE UP (ref 0.2–1.2)
BUN SERPL-MCNC: 14 MG/DL — SIGNIFICANT CHANGE UP (ref 7–23)
CALCIUM SERPL-MCNC: 8.7 MG/DL — SIGNIFICANT CHANGE UP (ref 8.5–10.1)
CHLORIDE SERPL-SCNC: 87 MMOL/L — LOW (ref 96–108)
CO2 SERPL-SCNC: 24 MMOL/L — SIGNIFICANT CHANGE UP (ref 22–31)
CREAT SERPL-MCNC: 1.19 MG/DL — SIGNIFICANT CHANGE UP (ref 0.5–1.3)
EGFR: 48 ML/MIN/1.73M2 — LOW
EOSINOPHIL # BLD AUTO: 0.02 K/UL — SIGNIFICANT CHANGE UP (ref 0–0.5)
EOSINOPHIL NFR BLD AUTO: 0.6 % — SIGNIFICANT CHANGE UP (ref 0–6)
GLUCOSE SERPL-MCNC: 98 MG/DL — SIGNIFICANT CHANGE UP (ref 70–99)
HCT VFR BLD CALC: 26.2 % — LOW (ref 34.5–45)
HGB BLD-MCNC: 8.8 G/DL — LOW (ref 11.5–15.5)
HYPOCHROMIA BLD QL: SIGNIFICANT CHANGE UP
IMM GRANULOCYTES NFR BLD AUTO: 0.6 % — SIGNIFICANT CHANGE UP (ref 0–1.5)
INR BLD: 1.04 RATIO — SIGNIFICANT CHANGE UP (ref 0.88–1.16)
LYMPHOCYTES # BLD AUTO: 1.14 K/UL — SIGNIFICANT CHANGE UP (ref 1–3.3)
LYMPHOCYTES # BLD AUTO: 31.6 % — SIGNIFICANT CHANGE UP (ref 13–44)
MAGNESIUM SERPL-MCNC: 1.8 MG/DL — SIGNIFICANT CHANGE UP (ref 1.6–2.6)
MANUAL SMEAR VERIFICATION: SIGNIFICANT CHANGE UP
MCHC RBC-ENTMCNC: 24.2 PG — LOW (ref 27–34)
MCHC RBC-ENTMCNC: 33.6 G/DL — SIGNIFICANT CHANGE UP (ref 32–36)
MCV RBC AUTO: 72 FL — LOW (ref 80–100)
MICROCYTES BLD QL: SLIGHT — SIGNIFICANT CHANGE UP
MONOCYTES # BLD AUTO: 0.39 K/UL — SIGNIFICANT CHANGE UP (ref 0–0.9)
MONOCYTES NFR BLD AUTO: 10.8 % — SIGNIFICANT CHANGE UP (ref 2–14)
NEUTROPHILS # BLD AUTO: 2.01 K/UL — SIGNIFICANT CHANGE UP (ref 1.8–7.4)
NEUTROPHILS NFR BLD AUTO: 55.6 % — SIGNIFICANT CHANGE UP (ref 43–77)
NRBC # BLD: 0 /100 WBCS — SIGNIFICANT CHANGE UP (ref 0–0)
NT-PROBNP SERPL-SCNC: 244 PG/ML — SIGNIFICANT CHANGE UP (ref 0–450)
PHOSPHATE SERPL-MCNC: 3.1 MG/DL — SIGNIFICANT CHANGE UP (ref 2.5–4.5)
PLAT MORPH BLD: NORMAL — SIGNIFICANT CHANGE UP
PLATELET # BLD AUTO: 176 K/UL — SIGNIFICANT CHANGE UP (ref 150–400)
POTASSIUM SERPL-MCNC: 3.5 MMOL/L — SIGNIFICANT CHANGE UP (ref 3.5–5.3)
POTASSIUM SERPL-SCNC: 3.5 MMOL/L — SIGNIFICANT CHANGE UP (ref 3.5–5.3)
PROT SERPL-MCNC: 6.9 GM/DL — SIGNIFICANT CHANGE UP (ref 6–8.3)
PROTHROM AB SERPL-ACNC: 12.4 SEC — SIGNIFICANT CHANGE UP (ref 10.5–13.4)
RBC # BLD: 3.64 M/UL — LOW (ref 3.8–5.2)
RBC # FLD: 13.2 % — SIGNIFICANT CHANGE UP (ref 10.3–14.5)
RBC BLD AUTO: ABNORMAL
SODIUM SERPL-SCNC: 122 MMOL/L — LOW (ref 135–145)
TROPONIN I, HIGH SENSITIVITY RESULT: 6.7 NG/L — SIGNIFICANT CHANGE UP
WBC # BLD: 3.61 K/UL — LOW (ref 3.8–10.5)
WBC # FLD AUTO: 3.61 K/UL — LOW (ref 3.8–10.5)

## 2022-05-24 PROCEDURE — 99291 CRITICAL CARE FIRST HOUR: CPT

## 2022-05-24 PROCEDURE — 99223 1ST HOSP IP/OBS HIGH 75: CPT

## 2022-05-24 PROCEDURE — 93010 ELECTROCARDIOGRAM REPORT: CPT

## 2022-05-24 RX ORDER — HYDRALAZINE HCL 50 MG
10 TABLET ORAL ONCE
Refills: 0 | Status: COMPLETED | OUTPATIENT
Start: 2022-05-24 | End: 2022-05-24

## 2022-05-24 RX ADMIN — Medication 10 MILLIGRAM(S): at 23:29

## 2022-05-24 NOTE — ED PROVIDER NOTE - CHIEF COMPLAINT
Addended by: BREANNA CURRY on: 9/11/2019 09:04 AM     Modules accepted: Orders     The patient is a 76y Female complaining of abnormal lab result.

## 2022-05-24 NOTE — ED ADULT NURSE NOTE - OBJECTIVE STATEMENT
A&ox4. patient denies any pain, SOB, HA at this time. patient states " I feel fine". patient Niece states  MD called and she was told bring her aunt  to ED b/c of abnormal lab results.

## 2022-05-24 NOTE — ED ADULT NURSE NOTE - NEURO MENTATION
Rounded on Kierra Subramanian, ED remains at capacity. Status of pt remains unchanged. Will continue to monitor.    normal

## 2022-05-24 NOTE — ED PROVIDER NOTE - NS ED MD DISPO TELE ADMIT OTHER FT
[address] [facility] [place] [address]  Phone: [phone]  Fax: [phone]          Return to Work/School    Patient: Jacki Newton  YOB: 1975   Date: 07/28/2020     To Whom It May Concern:     Jacki Newton was in contact with/seen in my office on 07/28/2020. COVID-19 is present in our communities across the state. There is limited testing for COVID at this time, so not all patients can be tested. In this situation, your employee meets the following criteria:     Jacki Newton has met the criteria for COVID-19 testing based upon symptoms, travel, and/or potential exposure. The test has been completed and is pending results at this time. During this time the employee is not able to work and should be quarantined per the Centers for Disease Control timelines.      If you have any questions or concerns, or if I can be of further assistance, please do not hesitate to contact me.     Sincerely,    Jt Aiken MD      hypertensive

## 2022-05-24 NOTE — ED PROVIDER NOTE - OBJECTIVE STATEMENT
76F PMHx of HTN HLD iron def anemia presenting with abnormal labs today. Was taken yesterday for general check up. Found to have low serum sodium. Otherwise denies any headaches, syncope, nausea/vomiting, chest pain, shortness of breath, abdominal pain, diarrhea, thirst, visual complaints, confusion per family member. At normal baseline mental status a/ox3, per daughter. Of note, has been recently placed on chlorthalidone 25mg oral on 5/9, likely cause of hyponatremia.

## 2022-05-24 NOTE — ED PROVIDER NOTE - PHYSICAL EXAMINATION
VITAL SIGNS: I have reviewed nursing notes and confirm.   GEN: Well-developed; well-nourished; in no acute distress. Speaking full sentences.  SKIN: Warm, pink, no rash, no diaphoresis, no cyanosis, well perfused.   HEAD: Normocephalic; atraumatic. No scalp lacerations, no abrasions.  NECK: Supple; non tender.   EYES: Pupils 3mm equal, round, reactive to light and accomodation, conjunctiva and sclera clear. Extra-ocular movements intact bilaterally.  ENT: No nasal discharge; airway clear. Trachea is midline. ORAL: No oropharyngeal exudates or erythema. Normal dentition.  CV: (+) bradycardia. S1, S2 normal; no murmurs, gallops, or rubs. No lower extremity pitting edema bilaterally. Capillary refill < 2 seconds throughout. Distal pulses intact 2+ throughout.  RESP: CTA bilaterally. No wheezes, rales, or rhonchi.   ABD: Normal bowel sounds, soft, non-distended, non-tender, no rebound, no guarding, no rigidity, no hepatosplenomegaly. No CVA tenderness bilaterally.  MSK: Normal range of motion and movement of all 4 extremities. No joint or muscular pain throughout. No clubbing.   BACK: No thoracolumbar midline or paravertebral tenderness. No step-offs or obvious deformities.  NEURO: Alert & oriented x 3, Grossly unremarkable. Sensory and motor intact throughout. No focal deficits. Gait: Fluid. Normal speech and coordination.   PSYCH: Cooperative, appropriate.

## 2022-05-24 NOTE — ED PROVIDER NOTE - PROGRESS NOTE DETAILS
On exam, asymptomatic bradycardia, sinus. Found to have hyponatremia of 122, but asymptomatic. however, -220s in ED, has been having issues with controlling her BP lately. Denies any chest pain, shortness of breath, abdominal pain, or headaches, asymptomatic htn. will treat w/ hydralazine target 20% decrease sbp 170s goal.

## 2022-05-24 NOTE — ED PROVIDER NOTE - CRITICAL CARE ATTENDING CONTRIBUTION TO CARE
I have discussed the case with the resident/mid-level provider. I have personally performed a history, physical exam, and my own medical decision making. I have reviewed the note and agree with the findings and plan with the following exceptions: None.    Upon my evaluation, this patient had a high probability of imminent or lifethreatening deterioration due to hyponatremia of 122, which required my direct attention, intervention, and personal management.     I have personally provided 30 minutes of critical care time exclusive of time spent on separately billable procedures. Time includes review of laboratory data, radiology results, discussion with consultants, and monitoring for potential decompensation. Interventions were performed as documented above.    - Jared An MD, Emergency Medicine and Medical Toxicology Attending.

## 2022-05-24 NOTE — ED PROVIDER NOTE - CLINICAL SUMMARY MEDICAL DECISION MAKING FREE TEXT BOX
DDX: hyponatremia secondary to chlorthalidone, timeline fits with chronic hyponatremia. asymptomatic hypertension.   - labs, ekg, re-eval, cardiac monitor.   - to be admitted DDX: hyponatremia secondary to chlorthalidone, timeline fits with chronic hyponatremia. asymptomatic hypertension.   - labs, ekg, re-eval, cardiac monitor.   - treated w/ hydralazine for htn, asymptomatic otherwise.  - to be admitted

## 2022-05-25 LAB
ANION GAP SERPL CALC-SCNC: 10 MMOL/L — SIGNIFICANT CHANGE UP (ref 5–17)
ANION GAP SERPL CALC-SCNC: 9 MMOL/L — SIGNIFICANT CHANGE UP (ref 5–17)
BUN SERPL-MCNC: 13 MG/DL — SIGNIFICANT CHANGE UP (ref 7–23)
BUN SERPL-MCNC: 16 MG/DL — SIGNIFICANT CHANGE UP (ref 7–23)
CALCIUM SERPL-MCNC: 9 MG/DL — SIGNIFICANT CHANGE UP (ref 8.5–10.1)
CALCIUM SERPL-MCNC: 9.1 MG/DL — SIGNIFICANT CHANGE UP (ref 8.5–10.1)
CHLORIDE SERPL-SCNC: 90 MMOL/L — LOW (ref 96–108)
CHLORIDE SERPL-SCNC: 93 MMOL/L — LOW (ref 96–108)
CO2 SERPL-SCNC: 26 MMOL/L — SIGNIFICANT CHANGE UP (ref 22–31)
CO2 SERPL-SCNC: 26 MMOL/L — SIGNIFICANT CHANGE UP (ref 22–31)
CREAT ?TM UR-MCNC: 28 MG/DL — SIGNIFICANT CHANGE UP
CREAT SERPL-MCNC: 1.16 MG/DL — SIGNIFICANT CHANGE UP (ref 0.5–1.3)
CREAT SERPL-MCNC: 1.33 MG/DL — HIGH (ref 0.5–1.3)
EGFR: 42 ML/MIN/1.73M2 — LOW
EGFR: 49 ML/MIN/1.73M2 — LOW
FERRITIN SERPL-MCNC: 1320 NG/ML — HIGH (ref 15–150)
FLUAV AG NPH QL: SIGNIFICANT CHANGE UP
FLUBV AG NPH QL: SIGNIFICANT CHANGE UP
FOLATE SERPL-MCNC: >20 NG/ML — SIGNIFICANT CHANGE UP
GLUCOSE SERPL-MCNC: 119 MG/DL — HIGH (ref 70–99)
GLUCOSE SERPL-MCNC: 86 MG/DL — SIGNIFICANT CHANGE UP (ref 70–99)
HCT VFR BLD CALC: 29.4 % — LOW (ref 34.5–45)
HGB BLD-MCNC: 9.6 G/DL — LOW (ref 11.5–15.5)
IRON SATN MFR SERPL: 24 % — SIGNIFICANT CHANGE UP (ref 14–50)
IRON SATN MFR SERPL: 49 UG/DL — SIGNIFICANT CHANGE UP (ref 30–160)
MCHC RBC-ENTMCNC: 23.8 PG — LOW (ref 27–34)
MCHC RBC-ENTMCNC: 32.7 G/DL — SIGNIFICANT CHANGE UP (ref 32–36)
MCV RBC AUTO: 72.8 FL — LOW (ref 80–100)
NRBC # BLD: 0 /100 WBCS — SIGNIFICANT CHANGE UP (ref 0–0)
OSMOLALITY SERPL: 256 MOSMOL/KG — LOW (ref 280–301)
OSMOLALITY UR: 190 MOSM/KG — SIGNIFICANT CHANGE UP (ref 50–1200)
PLATELET # BLD AUTO: 171 K/UL — SIGNIFICANT CHANGE UP (ref 150–400)
POTASSIUM SERPL-MCNC: 3.1 MMOL/L — LOW (ref 3.5–5.3)
POTASSIUM SERPL-MCNC: 3.5 MMOL/L — SIGNIFICANT CHANGE UP (ref 3.5–5.3)
POTASSIUM SERPL-SCNC: 3.1 MMOL/L — LOW (ref 3.5–5.3)
POTASSIUM SERPL-SCNC: 3.5 MMOL/L — SIGNIFICANT CHANGE UP (ref 3.5–5.3)
RBC # BLD: 4.04 M/UL — SIGNIFICANT CHANGE UP (ref 3.8–5.2)
RBC # FLD: 13.2 % — SIGNIFICANT CHANGE UP (ref 10.3–14.5)
SARS-COV-2 RNA SPEC QL NAA+PROBE: SIGNIFICANT CHANGE UP
SODIUM SERPL-SCNC: 126 MMOL/L — LOW (ref 135–145)
SODIUM SERPL-SCNC: 128 MMOL/L — LOW (ref 135–145)
SODIUM UR-SCNC: 29 MMOL/L — SIGNIFICANT CHANGE UP
TIBC SERPL-MCNC: 209 UG/DL — LOW (ref 220–430)
TSH SERPL-MCNC: 0.66 UIU/ML — SIGNIFICANT CHANGE UP (ref 0.36–3.74)
UIBC SERPL-MCNC: 160 UG/DL — SIGNIFICANT CHANGE UP (ref 110–370)
URATE SERPL-MCNC: 3.6 MG/DL — SIGNIFICANT CHANGE UP (ref 2.5–7)
URATE UR-MCNC: 7.2 MG/DL — SIGNIFICANT CHANGE UP
UUN UR-MCNC: 109 MG/DL — SIGNIFICANT CHANGE UP
WBC # BLD: 3.27 K/UL — LOW (ref 3.8–10.5)
WBC # FLD AUTO: 3.27 K/UL — LOW (ref 3.8–10.5)

## 2022-05-25 PROCEDURE — 99232 SBSQ HOSP IP/OBS MODERATE 35: CPT

## 2022-05-25 RX ORDER — ACETAMINOPHEN 500 MG
650 TABLET ORAL EVERY 6 HOURS
Refills: 0 | Status: DISCONTINUED | OUTPATIENT
Start: 2022-05-25 | End: 2022-05-28

## 2022-05-25 RX ORDER — ASPIRIN/CALCIUM CARB/MAGNESIUM 324 MG
81 TABLET ORAL DAILY
Refills: 0 | Status: DISCONTINUED | OUTPATIENT
Start: 2022-05-25 | End: 2022-05-28

## 2022-05-25 RX ORDER — AMLODIPINE BESYLATE 2.5 MG/1
10 TABLET ORAL DAILY
Refills: 0 | Status: DISCONTINUED | OUTPATIENT
Start: 2022-05-25 | End: 2022-05-28

## 2022-05-25 RX ORDER — LOSARTAN POTASSIUM 100 MG/1
100 TABLET, FILM COATED ORAL DAILY
Refills: 0 | Status: DISCONTINUED | OUTPATIENT
Start: 2022-05-25 | End: 2022-05-26

## 2022-05-25 RX ORDER — FERROUS SULFATE 325(65) MG
325 TABLET ORAL DAILY
Refills: 0 | Status: DISCONTINUED | OUTPATIENT
Start: 2022-05-25 | End: 2022-05-28

## 2022-05-25 RX ORDER — ATORVASTATIN CALCIUM 80 MG/1
40 TABLET, FILM COATED ORAL AT BEDTIME
Refills: 0 | Status: DISCONTINUED | OUTPATIENT
Start: 2022-05-25 | End: 2022-05-28

## 2022-05-25 RX ORDER — HYDRALAZINE HCL 50 MG
100 TABLET ORAL THREE TIMES A DAY
Refills: 0 | Status: DISCONTINUED | OUTPATIENT
Start: 2022-05-25 | End: 2022-05-28

## 2022-05-25 RX ADMIN — Medication 81 MILLIGRAM(S): at 11:47

## 2022-05-25 RX ADMIN — Medication 100 MILLIGRAM(S): at 13:33

## 2022-05-25 RX ADMIN — Medication 325 MILLIGRAM(S): at 11:47

## 2022-05-25 RX ADMIN — Medication 100 MILLIGRAM(S): at 21:06

## 2022-05-25 RX ADMIN — ATORVASTATIN CALCIUM 40 MILLIGRAM(S): 80 TABLET, FILM COATED ORAL at 21:06

## 2022-05-25 RX ADMIN — AMLODIPINE BESYLATE 10 MILLIGRAM(S): 2.5 TABLET ORAL at 05:44

## 2022-05-25 RX ADMIN — Medication 100 MILLIGRAM(S): at 05:44

## 2022-05-25 NOTE — H&P ADULT - HISTORY OF PRESENT ILLNESS
77 y/o F w/ PMHx of HTN, HLD sent in by PCP for low Na. Pt reports her BP has been fairly uncontrolled; was admitted in March for Hypertensive urgency, presented to the ED last month for lower extremity edema, hives, BP also elevated on that visit. Pt reports she followed up w/ PCP and was started on Chlorthalidone as well as Losartan/HCTZ, BB. Pt denies fatigue, HA, n/v.    On arrival in ED per attending initial SBP in 200s, though not documented, HR in 40s/50s, pt given 10mg IV Hydralazine. Labs sig for       75 y/o F w/ PMHx of HTN, HLD sent in by PCP for low Na. Pt reports her BP has been fairly uncontrolled; was admitted in March for Hypertensive urgency, presented to the ED last month for lower extremity edema, hives, BP also elevated on that visit. Pt reports she followed up w/ PCP and was started on Chlorthalidone as well as Losartan/HCTZ, BB. Pt denies fatigue, HA, n/v.    On arrival in ED per attending initial SBP in 200s, though not documented, HR in 40s/50s, pt given 10mg IV Hydralazine. Labs sig for WBC 3.61, H/H 8.8/26.2, Na 122, see below for rest of labs

## 2022-05-25 NOTE — H&P ADULT - NSHPLABSRESULTS_GEN_ALL_CORE
T(C): 36.7 (05-25-22 @ 03:52), Max: 36.7 (05-25-22 @ 01:31)  HR: 63 (05-25-22 @ 06:19) (47 - 79)  BP: 150/69 (05-25-22 @ 06:05) (148/68 - 185/54)  RR: 18 (05-25-22 @ 03:52) (16 - 19)  SpO2: 99% (05-25-22 @ 03:52) (95% - 100%)                        9.6    3.27  )-----------( 171      ( 25 May 2022 05:32 )             29.4     05-25    126<L>  |  90<L>  |  13  ----------------------------<  86  3.1<L>   |  26  |  1.16    Ca    9.1      25 May 2022 05:32  Phos  3.1     05-24  Mg     1.8     05-24    TPro  6.9  /  Alb  3.3  /  TBili  0.8  /  DBili  x   /  AST  23  /  ALT  17  /  AlkPhos  88  05-24    LIVER FUNCTIONS - ( 24 May 2022 22:20 )  Alb: 3.3 g/dL / Pro: 6.9 gm/dL / ALK PHOS: 88 U/L / ALT: 17 U/L / AST: 23 U/L / GGT: x           PT/INR - ( 24 May 2022 22:20 )   PT: 12.4 sec;   INR: 1.04 ratio         PTT - ( 24 May 2022 22:20 )  PTT:29.7 sec        acetaminophen     Tablet .. 650 milliGRAM(s) Oral every 6 hours PRN  amLODIPine   Tablet 10 milliGRAM(s) Oral daily  aspirin enteric coated 81 milliGRAM(s) Oral daily  atorvastatin 40 milliGRAM(s) Oral at bedtime  ferrous    sulfate 325 milliGRAM(s) Oral daily  hydrALAZINE 100 milliGRAM(s) Oral three times a day T(C): 36.7 (05-25-22 @ 03:52), Max: 36.7 (05-25-22 @ 01:31)  HR: 63 (05-25-22 @ 06:19) (47 - 79)  BP: 150/69 (05-25-22 @ 06:05) (148/68 - 185/54)  RR: 18 (05-25-22 @ 03:52) (16 - 19)  SpO2: 99% (05-25-22 @ 03:52) (95% - 100%)                        9.6    3.27  )-----------( 171      ( 25 May 2022 05:32 )             29.4     05-25    126<L>  |  90<L>  |  13  ----------------------------<  86  3.1<L>   |  26  |  1.16    Ca    9.1      25 May 2022 05:32  Phos  3.1     05-24  Mg     1.8     05-24    TPro  6.9  /  Alb  3.3  /  TBili  0.8  /  DBili  x   /  AST  23  /  ALT  17  /  AlkPhos  88  05-24    LIVER FUNCTIONS - ( 24 May 2022 22:20 )  Alb: 3.3 g/dL / Pro: 6.9 gm/dL / ALK PHOS: 88 U/L / ALT: 17 U/L / AST: 23 U/L / GGT: x           PT/INR - ( 24 May 2022 22:20 )   PT: 12.4 sec;   INR: 1.04 ratio         PTT - ( 24 May 2022 22:20 )  PTT:29.7 sec    EKG - Sinus oneil at 50 bpm w/ sinus arrhythmia, no acute isch changes    acetaminophen     Tablet .. 650 milliGRAM(s) Oral every 6 hours PRN  amLODIPine   Tablet 10 milliGRAM(s) Oral daily  aspirin enteric coated 81 milliGRAM(s) Oral daily  atorvastatin 40 milliGRAM(s) Oral at bedtime  ferrous    sulfate 325 milliGRAM(s) Oral daily  hydrALAZINE 100 milliGRAM(s) Oral three times a day

## 2022-05-25 NOTE — H&P ADULT - ASSESSMENT
CHIEF COMPLAINT:  Patient is a 61y old  Male who presents with a chief complaint of AMS 2/2 Pneumonia (27 Apr 2022 04:50)    HPI:  61 yr old male with PMHx of cerebral ataxia, who was originally admitted 4/17/22 with progressive weakness/fatigue accompanied by incontinence and episodes of NBNB emesis. Pt found to have circumferential esophagitis, mucoid impaction of LLL, asp pneum RUL/RLL (CT C/A/P 4/17/22), chronic left cerebellum lacunar infarcts (CT  4/17/22; 4/23/22), diffuse leptomeningeal enhancement (MRI 4/20/22) concerning for infection vs metastatic dz.      This hospital course initially c/b episodes of hypotension/hypothermia/bradycardia refractory to IVF therapy requiring vasopressor therapy (d/c'ed 4/24/22) and transfer to MICU 4/22/22 for septic shock secondary to asp pneum vs adrenal insufficiency (cortisol 6.4 4/21/22), tx with steroids/antibx therapy (ceftriaxone 4/26/22) in addition to prophylactically tx for suspected infectious meningitis, eventually d/c'ed after  LP from 4/23/22 demonstrated neg findings. Course further c/b worsening AMS with AGMA (resolved) requiring intubation for airway protection (4/23/22), large UOP concerning for suspected DI (now resolved). Pt remains intubated, responsive, with continued workup for metastatic/autoimmune/infectious processes      Interval Events:  intubated, slow to respond, answers simple questions by shaking head yes/no. denies sob/chest pain        REVIEW OF SYSTEMS:          OBJECTIVE:  ICU Vital Signs Last 24 Hrs  T(C): 36 (28 Apr 2022 04:00), Max: 36.8 (27 Apr 2022 20:00)  T(F): 96.8 (28 Apr 2022 04:00), Max: 98.2 (27 Apr 2022 20:00)  HR: 84 (28 Apr 2022 06:00) (69 - 100)  BP: 100/75 (28 Apr 2022 06:00) (87/61 - 122/73)  BP(mean): 82 (28 Apr 2022 06:00) (67 - 90)  ABP: --  ABP(mean): --  RR: 10 (28 Apr 2022 06:00) (10 - 28)  SpO2: 99% (28 Apr 2022 06:00) (93% - 100%)    Mode: AC/ CMV (Assist Control/ Continuous Mandatory Ventilation), RR (machine): 10, TV (machine): 380, FiO2: 21, PEEP: 5, ITime: 1, MAP: 8, PIP: 19    04-27 @ 07:01  -  04-28 @ 07:00  --------------------------------------------------------  IN: 1580 mL / OUT: 700 mL / NET: 880 mL      CAPILLARY BLOOD GLUCOSE      POCT Blood Glucose.: 108 mg/dL (28 Apr 2022 05:15)          PHYSICAL EXAM:  GENERAL:   NAD, well-groomed, well-developed    HEAD:    Atraumatic, Normocephalic    EYES:   PERRL 4 mm, conjunctiva and sclera clear    ENMT:   No oropharyngeal exudates, erythema or lesions,  Moist mucous membranes    NECK:   Supple, no cervical lymphadenopathy, no JVD    NERVOUS SYSTEM:  Responsive to verbal stimuli, focuses upon examiner, answers simple questions yes and no by shaking head  ,follows simple commands of demonstrating digits and extremities , lethargic,, CN II-XII intact, has spontaneous purposeful movement of  all extremities ; Upper extremities  4-5/5; Lower extremities 3/5, full sensation to light touch     CHEST/LUNG:   orally intubated, triggers vent by 12  Breaths, Pip 24   Ppl 14  DP 9  .Breath sounds bilaterally,  No crackles, diffuse rhonchi, without wheezing, or rubs. POCUS A line predominant with focal B lines concentrated in LLL field, small bilat pleural effusions, small areas of consolidation/bronchograms appreciated in bilat lower lung fields    HEART:   cardiac monitor Afib   ; S1/S2 No murmurs, rubs, or gallops, POCUS good LVFx RV<LV, VTI of 19, IVC 1.6    ABDOMEN:   Soft, Nontender, distended; Bowel sounds present, Bladder non distended, non palpable    EXTREMITIES:   Pulses palpable radial pulses 2+ bilat, DP/PT 2+/1+ bilat, without clubbing, cyanosis. Digits warm to touch with good cap refill < 3 secs, 1+-2+ bilat lower extremity edema    SKIN:   warm, dry, intact, normal color, no rash or abnormal lesions, without palpable nodes          HOSPITAL MEDICATIONS:  MEDICATIONS  (STANDING):  albuterol/ipratropium for Nebulization 3 milliLiter(s) Nebulizer every 6 hours  atorvastatin 20 milliGRAM(s) Oral at bedtime  chlorhexidine 0.12% Liquid 15 milliLiter(s) Oral Mucosa every 12 hours  dexMEDEtomidine Infusion 0.2 MICROgram(s)/kG/Hr (3.81 mL/Hr) IV Continuous <Continuous>  dextrose 50% Injectable 50 milliLiter(s) IV Push every 15 minutes  dextrose 50% Injectable 25 milliLiter(s) IV Push every 15 minutes  folic acid 1 milliGRAM(s) Oral daily  heparin   Injectable 5000 Unit(s) SubCutaneous every 12 hours  hydrocortisone sodium succinate Injectable 50 milliGRAM(s) IV Push every 8 hours  insulin lispro (ADMELOG) corrective regimen sliding scale   SubCutaneous every 6 hours  multivitamin 1 Tablet(s) Oral daily  mupirocin 2% Ointment 1 Application(s) Both Nostrils two times a day  polyethylene glycol 3350 17 Gram(s) Oral daily  senna Syrup 5 milliLiter(s) Oral at bedtime  thiamine 100 milliGRAM(s) Oral daily    MEDICATIONS  (PRN):  aluminum hydroxide/magnesium hydroxide/simethicone Suspension 30 milliLiter(s) Oral every 4 hours PRN Dyspepsia  ondansetron Injectable 4 milliGRAM(s) IV Push every 8 hours PRN Nausea and/or Vomiting      LABS:                        9.5    13.31 )-----------( 152      ( 28 Apr 2022 00:17 )             30.1     Hgb Trend: 9.5<--, 9.1<--, 9.4<--, 8.8<--, 8.4<--  04-28    142  |  106  |  24<H>  ----------------------------<  117<H>  3.5   |  25  |  0.58    Ca    8.3<L>      28 Apr 2022 00:16  Phos  3.0     04-28  Mg     2.2     04-28    TPro  5.6<L>  /  Alb  2.9<L>  /  TBili  0.1<L>  /  DBili  x   /  AST  38  /  ALT  55<H>  /  AlkPhos  61  04-28    LIVER FUNCTIONS - ( 28 Apr 2022 00:16 )  Alb: 2.9 g/dL / Pro: 5.6 g/dL / ALK PHOS: 61 U/L / ALT: 55 U/L / AST: 38 U/L / GGT: x           Creatinine Trend: 0.58<--, 0.71<--, 0.65<--, 0.77<--, 0.71<--, 0.76<--  PT/INR - ( 28 Apr 2022 00:17 )   PT: 11.8 sec;   INR: 1.02 ratio         PTT - ( 28 Apr 2022 00:17 )  PTT:26.0 sec    Arterial Blood Gas:  04-28 @ 00:13  7.47/42/82/31/98.7/6.3  ABG lactate: --  Arterial Blood Gas:  04-27 @ 00:09  7.49/41/77/31/97.5/7.2  ABG lactate: --        MICROBIOLOGY:     RADIOLOGY:  [ ] Reviewed and interpreted by me    EKG:      Dieter ANP-BC (ext [id number]) CHIEF COMPLAINT:  Patient is a 61y old  Male who presents with a chief complaint of AMS 2/2 Pneumonia (27 Apr 2022 04:50)    HPI:  61 yr old male with PMHx of cerebral ataxia, who was originally admitted 4/17/22 with progressive weakness/fatigue accompanied by incontinence and episodes of NBNB emesis. Pt found to have circumferential esophagitis, mucoid impaction of LLL, asp pneum RUL/RLL (CT C/A/P 4/17/22), chronic left cerebellum lacunar infarcts (CT  4/17/22; 4/23/22), diffuse leptomeningeal enhancement (MRI 4/20/22) concerning for infection vs metastatic dz.      This hospital course initially c/b episodes of hypotension/hypothermia/bradycardia refractory to IVF therapy requiring vasopressor therapy (d/c'ed 4/24/22) and transfer to MICU 4/22/22 for septic shock secondary to asp pneum vs adrenal insufficiency (cortisol 6.4 4/21/22), tx with steroids/antibx therapy (ceftriaxone 4/26/22) in addition to prophylactically tx for suspected infectious meningitis, eventually d/c'ed after  LP from 4/23/22 demonstrated neg findings. Course further c/b worsening AMS with AGMA (resolved) requiring intubation for airway protection (4/23/22), large UOP concerning for suspected DI (now resolved). Pt remains intubated, responsive, with continued workup for metastatic/autoimmune/infectious processes      Interval Events:          REVIEW OF SYSTEMS:  intubated, slow to respond, answers simple questions by shaking head yes/no. denies sob/chest pain        OBJECTIVE:  ICU Vital Signs Last 24 Hrs  T(C): 36 (28 Apr 2022 04:00), Max: 36.8 (27 Apr 2022 20:00)  T(F): 96.8 (28 Apr 2022 04:00), Max: 98.2 (27 Apr 2022 20:00)  HR: 84 (28 Apr 2022 06:00) (69 - 100)  BP: 100/75 (28 Apr 2022 06:00) (87/61 - 122/73)  BP(mean): 82 (28 Apr 2022 06:00) (67 - 90)  ABP: --  ABP(mean): --  RR: 10 (28 Apr 2022 06:00) (10 - 28)  SpO2: 99% (28 Apr 2022 06:00) (93% - 100%)    Mode: AC/ CMV (Assist Control/ Continuous Mandatory Ventilation), RR (machine): 10, TV (machine): 380, FiO2: 21, PEEP: 5, ITime: 1, MAP: 8, PIP: 19    04-27 @ 07:01  -  04-28 @ 07:00  --------------------------------------------------------  IN: 1580 mL / OUT: 700 mL / NET: 880 mL      CAPILLARY BLOOD GLUCOSE      POCT Blood Glucose.: 108 mg/dL (28 Apr 2022 05:15)          PHYSICAL EXAM:  GENERAL:   NAD, well-groomed, well-developed    HEAD:    Atraumatic, Normocephalic    EYES:   PERRL 3 mm, conjunctiva and sclera clear    ENMT:   No oropharyngeal exudates, erythema or lesions,  Moist mucous membranes    NECK:   Supple, no cervical lymphadenopathy, no JVD    NERVOUS SYSTEM:  Responsive to verbal stimuli, focuses upon examiner, answers simple questions yes and no by shaking head  ,follows simple commands of demonstrating digits and extremities , lethargic,, CN II-XII intact, has spontaneous purposeful movement of  all extremities ; Upper extremities  4-5/5; Lower extremities 3/5, full sensation to light touch     CHEST/LUNG:   orally intubated, triggers vent by 12  Breaths, Pip 22   Ppl 12  DP 7, changed to PSV 10/5 RR 10 to 18 SpVt of 250 to 600 cc's  .Breath sounds bilaterally,  No crackles, diffuse rhonchi, without wheezing, or rubs. POCUS A line predominant with focal B lines concentrated in LLL field, small bilat pleural effusions, small areas of consolidation/bronchograms appreciated in bilat lower lung fields    HEART:   cardiac monitor Afib   ; S1/S2 No murmurs, rubs, or gallops, POCUS good LVFx RV<LV, VTI of 17, IVC 1.8    ABDOMEN:   Soft, Nontender, distended; Bowel sounds present, Bladder non distended, non palpable    EXTREMITIES:   Pulses palpable radial pulses 2+ bilat, DP/PT 2+/1+ bilat, without clubbing, cyanosis. Digits warm to touch with good cap refill < 3 secs, 1+-2+ bilat lower extremity edema    SKIN:   warm, dry, intact, normal color, no rash or abnormal lesions, without palpable nodes          HOSPITAL MEDICATIONS:  MEDICATIONS  (STANDING):  albuterol/ipratropium for Nebulization 3 milliLiter(s) Nebulizer every 6 hours  atorvastatin 20 milliGRAM(s) Oral at bedtime  chlorhexidine 0.12% Liquid 15 milliLiter(s) Oral Mucosa every 12 hours  dexMEDEtomidine Infusion 0.2 MICROgram(s)/kG/Hr (3.81 mL/Hr) IV Continuous <Continuous>  dextrose 50% Injectable 50 milliLiter(s) IV Push every 15 minutes  dextrose 50% Injectable 25 milliLiter(s) IV Push every 15 minutes  folic acid 1 milliGRAM(s) Oral daily  heparin   Injectable 5000 Unit(s) SubCutaneous every 12 hours  hydrocortisone sodium succinate Injectable 50 milliGRAM(s) IV Push every 8 hours  insulin lispro (ADMELOG) corrective regimen sliding scale   SubCutaneous every 6 hours  multivitamin 1 Tablet(s) Oral daily  mupirocin 2% Ointment 1 Application(s) Both Nostrils two times a day  polyethylene glycol 3350 17 Gram(s) Oral daily  senna Syrup 5 milliLiter(s) Oral at bedtime  thiamine 100 milliGRAM(s) Oral daily    MEDICATIONS  (PRN):  aluminum hydroxide/magnesium hydroxide/simethicone Suspension 30 milliLiter(s) Oral every 4 hours PRN Dyspepsia  ondansetron Injectable 4 milliGRAM(s) IV Push every 8 hours PRN Nausea and/or Vomiting      LABS:                        9.5    13.31 )-----------( 152      ( 28 Apr 2022 00:17 )             30.1     Hgb Trend: 9.5<--, 9.1<--, 9.4<--, 8.8<--, 8.4<--  04-28    142  |  106  |  24<H>  ----------------------------<  117<H>  3.5   |  25  |  0.58    Ca    8.3<L>      28 Apr 2022 00:16  Phos  3.0     04-28  Mg     2.2     04-28    TPro  5.6<L>  /  Alb  2.9<L>  /  TBili  0.1<L>  /  DBili  x   /  AST  38  /  ALT  55<H>  /  AlkPhos  61  04-28    LIVER FUNCTIONS - ( 28 Apr 2022 00:16 )  Alb: 2.9 g/dL / Pro: 5.6 g/dL / ALK PHOS: 61 U/L / ALT: 55 U/L / AST: 38 U/L / GGT: x           Creatinine Trend: 0.58<--, 0.71<--, 0.65<--, 0.77<--, 0.71<--, 0.76<--  PT/INR - ( 28 Apr 2022 00:17 )   PT: 11.8 sec;   INR: 1.02 ratio         PTT - ( 28 Apr 2022 00:17 )  PTT:26.0 sec    Arterial Blood Gas:  04-28 @ 00:13  7.47/42/82/31/98.7/6.3  ABG lactate: --  Arterial Blood Gas:  04-27 @ 00:09  7.49/41/77/31/97.5/7.2  ABG lactate: --        MICROBIOLOGY:     RADIOLOGY:  [ ] Reviewed and interpreted by me    EKG:      Dieter ANP-BC (ext 6024)   77 y/o F w/ PMHx of HTN (uncontrolled), HLD sent in by PCP for hyponatremia likely secondary to new medications    1) Hyponatremia  - Medication induced  - discontinue Chlorthalidone  - pt cont to trend Na; should/will improve on own after medication d/richa    2) Uncontrolled HTN  - Pt w/ significant Polypharmacy; unclear what she is actively taking  - pt w/ noted Bradycardia; d/c Atenolol  - w/ Hydralazine; inc to 100 TID  - Hold HCTZ likely also contributory to low Na, c/w Losartan  - c/w Amlodipine  - Please review medications in detail at discharge    3) HLD  - c/w statin    4) Anemia  - Chronic stable  - c/w Iron    5) DVT ppx - low risk, SCDs         77 y/o F w/ PMHx of HTN (uncontrolled), HLD sent in by PCP for hyponatremia likely secondary to new medications    1) Hyponatremia  - Medication induced  - discontinue Chlorthalidone  - pt cont to trend Na; should/will improve on own after medication d/richa    2) Uncontrolled HTN  - Pt w/ significant Polypharmacy; unclear what she is actively taking  - pt w/ noted Bradycardia; d/c Atenolol  - w/ Hydralazine; inc to 100 TID  - Hold HCTZ likely also contributory to low Na, c/w Losartan  - c/w Amlodipine  - Please review medications in detail at discharge    3) HLD  - c/w statin    4) Anemia  - noted initially on ED visit last month, guaiac neg at the time  - no reported BRBPR or melena  - pt also mildly neutropenic  - f/u anemia panel  - outpt hematology eval    - c/w Iron    5) DVT ppx - low risk, SCDs

## 2022-05-25 NOTE — PATIENT PROFILE ADULT - NSFALLSECTIONLABEL_GEN_A_CORE
Left message for patient's POA.  Contacted nursing home, patient's nurse Michele states the patient is doing well.  We discussed she has significant resistance on urine culture and will require IV antibiotics for treatment.  Message sent to patient's primary physician to see if she would be able to help with setting that up. .

## 2022-05-25 NOTE — H&P ADULT - NSHPPHYSICALEXAM_GEN_ALL_CORE
PHYSICAL EXAM:    Vital Signs Last 24 Hrs  T(C): 36.7 (25 May 2022 03:52), Max: 36.7 (25 May 2022 01:31)  T(F): 98.1 (25 May 2022 03:52), Max: 98.1 (25 May 2022 01:31)  HR: 63 (25 May 2022 06:19) (47 - 79)  BP: 150/69 (25 May 2022 06:05) (148/68 - 185/54)  BP(mean): --  RR: 18 (25 May 2022 03:52) (16 - 19)  SpO2: 99% (25 May 2022 03:52) (95% - 100%)    GENERAL: Pt lying in bed comfortably in NAD  HEENT:  Atraumatic, EOMI, PERRL, conjunctiva and sclera clear, MMM  NECK: Supple, No JVD  CHEST/LUNG: Clear to auscultation bilaterally; No rales, rhonchi, wheezing or rubs. Unlabored respirations  HEART: Regular rate and rhythm; No murmurs, rubs, or gallops  ABDOMEN: Bowel sounds present; Soft, Nontender, Nondistended. No guarding or rigidity    EXTREMITIES:  2+ Peripheral Pulses, brisk capillary refill. No clubbing, cyanosis, or edema  NEUROLOGICAL:  Alert & Oriented X3, speech clear. Answers questions appropriately. Full and equal strength B/L upper and lower extremities. No deficits   MSK: FROM x 4 extremities   SKIN: No rashes or lesions PHYSICAL EXAM:    Vital Signs Last 24 Hrs  T(C): 36.7 (25 May 2022 03:52), Max: 36.7 (25 May 2022 01:31)  T(F): 98.1 (25 May 2022 03:52), Max: 98.1 (25 May 2022 01:31)  HR: 63 (25 May 2022 06:19) (47 - 79)  BP: 150/69 (25 May 2022 06:05) (148/68 - 185/54)  BP(mean): --  RR: 18 (25 May 2022 03:52) (16 - 19)  SpO2: 99% (25 May 2022 03:52) (95% - 100%)    GENERAL: Pt lying in bed comfortably in NAD  HEENT:  Atraumatic, EOMI, PERRL, conjunctiva and sclera clear, MMM  NECK: Supple  CHEST/LUNG: Clear to auscultation bilaterally;  HEART: Regular rate and rhythm;  ABDOMEN: Bowel sounds present; Soft, Nontender, Nondistended.   EXTREMITIES:  2+ Peripheral Pulses, brisk capillary refill. No edema  NEUROLOGICAL:  Alert & Oriented X3, No focal deficits   MSK: FROM x 4 extremities   SKIN: warm, dry

## 2022-05-26 LAB
ANION GAP SERPL CALC-SCNC: 8 MMOL/L — SIGNIFICANT CHANGE UP (ref 5–17)
BUN SERPL-MCNC: 16 MG/DL — SIGNIFICANT CHANGE UP (ref 7–23)
CALCIUM SERPL-MCNC: 9.1 MG/DL — SIGNIFICANT CHANGE UP (ref 8.5–10.1)
CHLORIDE SERPL-SCNC: 95 MMOL/L — LOW (ref 96–108)
CO2 SERPL-SCNC: 25 MMOL/L — SIGNIFICANT CHANGE UP (ref 22–31)
CREAT SERPL-MCNC: 1.32 MG/DL — HIGH (ref 0.5–1.3)
EGFR: 42 ML/MIN/1.73M2 — LOW
GLUCOSE SERPL-MCNC: 93 MG/DL — SIGNIFICANT CHANGE UP (ref 70–99)
POTASSIUM SERPL-MCNC: 3.5 MMOL/L — SIGNIFICANT CHANGE UP (ref 3.5–5.3)
POTASSIUM SERPL-SCNC: 3.5 MMOL/L — SIGNIFICANT CHANGE UP (ref 3.5–5.3)
SODIUM SERPL-SCNC: 128 MMOL/L — LOW (ref 135–145)

## 2022-05-26 PROCEDURE — 99232 SBSQ HOSP IP/OBS MODERATE 35: CPT

## 2022-05-26 RX ADMIN — Medication 100 MILLIGRAM(S): at 13:01

## 2022-05-26 RX ADMIN — AMLODIPINE BESYLATE 10 MILLIGRAM(S): 2.5 TABLET ORAL at 05:31

## 2022-05-26 RX ADMIN — ATORVASTATIN CALCIUM 40 MILLIGRAM(S): 80 TABLET, FILM COATED ORAL at 21:19

## 2022-05-26 RX ADMIN — Medication 100 MILLIGRAM(S): at 21:19

## 2022-05-26 RX ADMIN — Medication 100 MILLIGRAM(S): at 05:31

## 2022-05-27 LAB
ANION GAP SERPL CALC-SCNC: 7 MMOL/L — SIGNIFICANT CHANGE UP (ref 5–17)
ANION GAP SERPL CALC-SCNC: 8 MMOL/L — SIGNIFICANT CHANGE UP (ref 5–17)
APPEARANCE UR: CLEAR — SIGNIFICANT CHANGE UP
BACTERIA # UR AUTO: ABNORMAL
BILIRUB UR-MCNC: NEGATIVE — SIGNIFICANT CHANGE UP
BUN SERPL-MCNC: 17 MG/DL — SIGNIFICANT CHANGE UP (ref 7–23)
BUN SERPL-MCNC: 20 MG/DL — SIGNIFICANT CHANGE UP (ref 7–23)
CALCIUM SERPL-MCNC: 9 MG/DL — SIGNIFICANT CHANGE UP (ref 8.5–10.1)
CALCIUM SERPL-MCNC: 9 MG/DL — SIGNIFICANT CHANGE UP (ref 8.5–10.1)
CHLORIDE SERPL-SCNC: 94 MMOL/L — LOW (ref 96–108)
CHLORIDE SERPL-SCNC: 98 MMOL/L — SIGNIFICANT CHANGE UP (ref 96–108)
CO2 SERPL-SCNC: 27 MMOL/L — SIGNIFICANT CHANGE UP (ref 22–31)
CO2 SERPL-SCNC: 30 MMOL/L — SIGNIFICANT CHANGE UP (ref 22–31)
COLOR SPEC: YELLOW — SIGNIFICANT CHANGE UP
CREAT SERPL-MCNC: 1.45 MG/DL — HIGH (ref 0.5–1.3)
CREAT SERPL-MCNC: 1.63 MG/DL — HIGH (ref 0.5–1.3)
DIFF PNL FLD: NEGATIVE — SIGNIFICANT CHANGE UP
EGFR: 33 ML/MIN/1.73M2 — LOW
EGFR: 38 ML/MIN/1.73M2 — LOW
EPI CELLS # UR: ABNORMAL
GLUCOSE SERPL-MCNC: 102 MG/DL — HIGH (ref 70–99)
GLUCOSE SERPL-MCNC: 99 MG/DL — SIGNIFICANT CHANGE UP (ref 70–99)
GLUCOSE UR QL: NEGATIVE MG/DL — SIGNIFICANT CHANGE UP
KETONES UR-MCNC: NEGATIVE — SIGNIFICANT CHANGE UP
LEUKOCYTE ESTERASE UR-ACNC: NEGATIVE — SIGNIFICANT CHANGE UP
NITRITE UR-MCNC: NEGATIVE — SIGNIFICANT CHANGE UP
PH UR: 6 — SIGNIFICANT CHANGE UP (ref 5–8)
POTASSIUM SERPL-MCNC: 3.6 MMOL/L — SIGNIFICANT CHANGE UP (ref 3.5–5.3)
POTASSIUM SERPL-MCNC: 3.8 MMOL/L — SIGNIFICANT CHANGE UP (ref 3.5–5.3)
POTASSIUM SERPL-SCNC: 3.6 MMOL/L — SIGNIFICANT CHANGE UP (ref 3.5–5.3)
POTASSIUM SERPL-SCNC: 3.8 MMOL/L — SIGNIFICANT CHANGE UP (ref 3.5–5.3)
PROT UR-MCNC: NEGATIVE MG/DL — SIGNIFICANT CHANGE UP
SODIUM SERPL-SCNC: 132 MMOL/L — LOW (ref 135–145)
SODIUM SERPL-SCNC: 132 MMOL/L — LOW (ref 135–145)
SP GR SPEC: 1.01 — SIGNIFICANT CHANGE UP (ref 1.01–1.02)
UROBILINOGEN FLD QL: NEGATIVE MG/DL — SIGNIFICANT CHANGE UP
WBC UR QL: SIGNIFICANT CHANGE UP

## 2022-05-27 PROCEDURE — 99233 SBSQ HOSP IP/OBS HIGH 50: CPT

## 2022-05-27 PROCEDURE — 76775 US EXAM ABDO BACK WALL LIM: CPT | Mod: 26

## 2022-05-27 RX ORDER — SODIUM CHLORIDE 9 MG/ML
1000 INJECTION INTRAMUSCULAR; INTRAVENOUS; SUBCUTANEOUS
Refills: 0 | Status: DISCONTINUED | OUTPATIENT
Start: 2022-05-27 | End: 2022-05-28

## 2022-05-27 RX ADMIN — Medication 325 MILLIGRAM(S): at 11:55

## 2022-05-27 RX ADMIN — ATORVASTATIN CALCIUM 40 MILLIGRAM(S): 80 TABLET, FILM COATED ORAL at 21:10

## 2022-05-27 RX ADMIN — Medication 100 MILLIGRAM(S): at 16:03

## 2022-05-27 RX ADMIN — Medication 81 MILLIGRAM(S): at 11:55

## 2022-05-27 RX ADMIN — Medication 100 MILLIGRAM(S): at 21:10

## 2022-05-27 RX ADMIN — SODIUM CHLORIDE 75 MILLILITER(S): 9 INJECTION INTRAMUSCULAR; INTRAVENOUS; SUBCUTANEOUS at 21:10

## 2022-05-27 NOTE — PROGRESS NOTE ADULT - SUBJECTIVE AND OBJECTIVE BOX
Patient is a 76y old  Female who presents with a chief complaint of Hyponatremia, Hypertensive Urgency, Anemia (25 May 2022 00:09)    INTERVAL HPI/OVERNIGHT EVENTS:  Pt was seen and examined, no acute events.    MEDICATIONS  (STANDING):  amLODIPine   Tablet 10 milliGRAM(s) Oral daily  aspirin enteric coated 81 milliGRAM(s) Oral daily  atorvastatin 40 milliGRAM(s) Oral at bedtime  ferrous    sulfate 325 milliGRAM(s) Oral daily  hydrALAZINE 100 milliGRAM(s) Oral three times a day  losartan 100 milliGRAM(s) Oral daily    MEDICATIONS  (PRN):  acetaminophen     Tablet .. 650 milliGRAM(s) Oral every 6 hours PRN Temp greater or equal to 38C (100.4F), Mild Pain (1 - 3)      Allergies  No Known Allergies        Vital Signs Last 24 Hrs  T(C): 36.8 (25 May 2022 16:25), Max: 36.8 (25 May 2022 10:48)  T(F): 98.2 (25 May 2022 16:25), Max: 98.2 (25 May 2022 10:48)  HR: 56 (25 May 2022 16:25) (48 - 79)  BP: 116/61 (25 May 2022 16:25) (116/61 - 185/54)  BP(mean): --  RR: 17 (25 May 2022 16:25) (16 - 18)  SpO2: 100% (25 May 2022 16:25) (95% - 100%)    PHYSICAL EXAM:  GENERAL: NAD  HEAD:  Atraumatic  EYES: PERRLA  NERVOUS SYSTEM:  Awake, alert  CHEST/LUNG: No edema  HEART: RRR, S1, S2  ABDOMEN: Soft, non tender  EXTREMITIES:  no edema  SKIN: no rash    LABS:                        9.6    3.27  )-----------( 171      ( 25 May 2022 05:32 )             29.4     05-25    128<L>  |  93<L>  |  16  ----------------------------<  119<H>  3.5   |  26  |  1.33<H>    Ca    9.0      25 May 2022 18:55  Phos  3.1     05-24  Mg     1.8     05-24    TPro  6.9  /  Alb  3.3  /  TBili  0.8  /  DBili  x   /  AST  23  /  ALT  17  /  AlkPhos  88  05-24    PT/INR - ( 24 May 2022 22:20 )   PT: 12.4 sec;   INR: 1.04 ratio         PTT - ( 24 May 2022 22:20 )  PTT:29.7 sec    CAPILLARY BLOOD GLUCOSE          RADIOLOGY & ADDITIONAL TESTS:    Imaging Personally Reviewed:  [ ] YES  [ ] NO    Consultant(s) Notes Reviewed:  [ ] YES  [ ] NO    Care Discussed with Consultants/Other Providers [ ] YES  [ ] NO
Patient is a 76y old  Female who presents with a chief complaint of Hyponatremia, Hypertensive Urgency, Anemia (26 May 2022 19:06)    INTERVAL HPI/OVERNIGHT EVENTS:  Pt was seen and examined, no acute events.    MEDICATIONS  (STANDING):  amLODIPine   Tablet 10 milliGRAM(s) Oral daily  aspirin enteric coated 81 milliGRAM(s) Oral daily  atorvastatin 40 milliGRAM(s) Oral at bedtime  ferrous    sulfate 325 milliGRAM(s) Oral daily  hydrALAZINE 100 milliGRAM(s) Oral three times a day  sodium chloride 0.9%. 1000 milliLiter(s) (75 mL/Hr) IV Continuous <Continuous>    MEDICATIONS  (PRN):  acetaminophen     Tablet .. 650 milliGRAM(s) Oral every 6 hours PRN Temp greater or equal to 38C (100.4F), Mild Pain (1 - 3)      Allergies  No Known Allergies      Vital Signs Last 24 Hrs  T(C): 37.1 (27 May 2022 15:31), Max: 37.1 (27 May 2022 15:31)  T(F): 98.7 (27 May 2022 15:31), Max: 98.7 (27 May 2022 15:31)  HR: 55 (27 May 2022 11:14) (50 - 75)  BP: 130/47 (27 May 2022 15:31) (93/58 - 156/53)  BP(mean): --  RR: 18 (27 May 2022 15:31) (18 - 18)  SpO2: 97% (27 May 2022 15:31) (97% - 100%)      PHYSICAL EXAM:  GENERAL: NAD  HEAD:  Atraumatic  EYES: PERRLA  NERVOUS SYSTEM:  Awake, alert  CHEST/LUNG: No edema  HEART: RRR, S1, S2  ABDOMEN: Soft, non tender  EXTREMITIES:  no edema  SKIN: no rash      LABS:    05-27    132<L>  |  94<L>  |  20  ----------------------------<  102<H>  3.8   |  30  |  1.63<H>    Ca    9.0      27 May 2022 16:50          CAPILLARY BLOOD GLUCOSE          RADIOLOGY & ADDITIONAL TESTS:    Imaging Personally Reviewed:  [ ] YES  [ ] NO    Consultant(s) Notes Reviewed:  [ ] YES  [ ] NO    Care Discussed with Consultants/Other Providers [ ] YES  [ ] NO
Patient is a 76y old  Female who presents with a chief complaint of Hyponatremia, Hypertensive Urgency, Anemia (25 May 2022 23:03)    INTERVAL HPI/OVERNIGHT EVENTS:  Pt was seen and examined, no acute events.    MEDICATIONS  (STANDING):  amLODIPine   Tablet 10 milliGRAM(s) Oral daily  aspirin enteric coated 81 milliGRAM(s) Oral daily  atorvastatin 40 milliGRAM(s) Oral at bedtime  ferrous    sulfate 325 milliGRAM(s) Oral daily  hydrALAZINE 100 milliGRAM(s) Oral three times a day    MEDICATIONS  (PRN):  acetaminophen     Tablet .. 650 milliGRAM(s) Oral every 6 hours PRN Temp greater or equal to 38C (100.4F), Mild Pain (1 - 3)      Allergies  No Known Allergies      Vital Signs Last 24 Hrs  T(C): 36.6 (26 May 2022 17:26), Max: 37.3 (26 May 2022 00:02)  T(F): 97.8 (26 May 2022 17:26), Max: 99.2 (26 May 2022 00:02)  HR: 55 (26 May 2022 17:26) (48 - 63)  BP: 135/64 (26 May 2022 17:26) (112/52 - 164/69)  BP(mean): --  RR: 18 (26 May 2022 17:26) (18 - 18)  SpO2: 99% (26 May 2022 17:26) (99% - 100%)      PHYSICAL EXAM:  GENERAL: NAD  HEAD:  Atraumatic  EYES: PERRLA  NERVOUS SYSTEM:  Awake, alert  CHEST/LUNG: No edema  HEART: RRR, S1, S2  ABDOMEN: Soft, non tender  EXTREMITIES:  no edema  SKIN: no rash      LABS:                        9.6    3.27  )-----------( 171      ( 25 May 2022 05:32 )             29.4     05-26    128<L>  |  95<L>  |  16  ----------------------------<  93  3.5   |  25  |  1.32<H>    Ca    9.1      26 May 2022 08:06  Phos  3.1     05-24  Mg     1.8     05-24    TPro  6.9  /  Alb  3.3  /  TBili  0.8  /  DBili  x   /  AST  23  /  ALT  17  /  AlkPhos  88  05-24    PT/INR - ( 24 May 2022 22:20 )   PT: 12.4 sec;   INR: 1.04 ratio         PTT - ( 24 May 2022 22:20 )  PTT:29.7 sec    CAPILLARY BLOOD GLUCOSE          RADIOLOGY & ADDITIONAL TESTS:    Imaging Personally Reviewed:  [ ] YES  [ ] NO    Consultant(s) Notes Reviewed:  [ ] YES  [ ] NO    Care Discussed with Consultants/Other Providers [ ] YES  [ ] NO

## 2022-05-27 NOTE — PROGRESS NOTE ADULT - ASSESSMENT
75 y/o F w/ PMHx of HTN (uncontrolled), HLD sent in by PCP for hyponatremia likely secondary to new medications    1) Hyponatremia  - Medication induced  - discontinue Chlorthalidone  - pt cont to trend Na; should/will improve on own after medication d/richa    2) Uncontrolled HTN  - Pt w/ significant Polypharmacy; unclear what she is actively taking  - pt w/ noted Bradycardia; d/c Atenolol  - w/ Hydralazine; inc to 100 TID  - Hold HCTZ likely also contributory to low Na, c/w Losartan  - c/w Amlodipine    3) HLD  - c/w statin    4) Anemia  - noted initially on ED visit last month, guaiac neg at the time  - no reported BRBPR or melena  - pt also mildly neutropenic  - f/u anemia panel  - outpt hematology eval    - c/w Iron    5) DVT ppx - low risk, SCDs      
  77 y/o F w/ PMHx of HTN (uncontrolled), HLD sent in by PCP for hyponatremia likely secondary to new medications    1) Hyponatremia  - Medication induced, pt admits of also drinking water all day  - Urine lytes noted   - discontinued Chlorthalidone  - pt cont to trend Na; correcting at appropriate rate  - Maintain fluid restriction     2) Uncontrolled HTN  - Pt w/ significant Polypharmacy; unclear what she is actively taking  - pt w/ noted Bradycardia; d/richa Atenolol  - w/ Hydralazine; inc to 100 TID  - discontinued Chlorthalidone Hold  Losartan for worsening cr ( was recently changed from HCTZ - Losartan  - c/w Amlodipine  - BP acceptable today    3) HLD  - c/w statin    4) Anemia  - Microcytic anemia  - noted initially on ED visit last month, guaiac neg at the time  - no reported BRBPR or melena  - On PO iron, not iron deficient in iron profile  - outpt hematology eval    - c/w Iron    5) DVT ppx - low risk, SCDs      
75 y/o F w/ PMHx of HTN (uncontrolled), HLD sent in by PCP for hyponatremia likely secondary to new medications    Hyponatremia  - Medication induced, pt admits of also drinking water all day  - Urine lytes noted   - discontinued Chlorthalidone  - pt cont to trend Na; correcting at appropriate rate  - Maintain fluid restriction     Uncontrolled HTN  - Pt w/ significant Polypharmacy; unclear what she is actively taking  - pt w/ noted Bradycardia; d/richa Atenolol  - w/ Hydralazine; inc to 100 TID  - discontinued Chlorthalidone Hold  Losartan for worsening cr ( was recently changed from HCTZ - Losartan  - c/w Amlodipine  - BP acceptable today    HLD  - c/w statin    Anemia  - Microcytic anemia  - noted initially on ED visit last month, guaiac neg at the time  - no reported BRBPR or melena  - On PO iron, not iron deficient in iron profile  - outpt hematology eval    - c/w Iron    ROSA:  - Cr worsening  - Episode of hypotension early am  - Follow up urine lyte  - Renal US  - Trial of IVF    DVT ppx - low risk, SCDs

## 2022-05-28 ENCOUNTER — TRANSCRIPTION ENCOUNTER (OUTPATIENT)
Age: 77
End: 2022-05-28

## 2022-05-28 VITALS
DIASTOLIC BLOOD PRESSURE: 65 MMHG | HEART RATE: 72 BPM | TEMPERATURE: 98 F | SYSTOLIC BLOOD PRESSURE: 112 MMHG | RESPIRATION RATE: 18 BRPM | OXYGEN SATURATION: 100 %

## 2022-05-28 LAB
ANION GAP SERPL CALC-SCNC: 7 MMOL/L — SIGNIFICANT CHANGE UP (ref 5–17)
BUN SERPL-MCNC: 22 MG/DL — SIGNIFICANT CHANGE UP (ref 7–23)
CALCIUM SERPL-MCNC: 8.7 MG/DL — SIGNIFICANT CHANGE UP (ref 8.5–10.1)
CHLORIDE SERPL-SCNC: 99 MMOL/L — SIGNIFICANT CHANGE UP (ref 96–108)
CO2 SERPL-SCNC: 26 MMOL/L — SIGNIFICANT CHANGE UP (ref 22–31)
CREAT SERPL-MCNC: 1.26 MG/DL — SIGNIFICANT CHANGE UP (ref 0.5–1.3)
EGFR: 45 ML/MIN/1.73M2 — LOW
GLUCOSE SERPL-MCNC: 104 MG/DL — HIGH (ref 70–99)
POTASSIUM SERPL-MCNC: 3.8 MMOL/L — SIGNIFICANT CHANGE UP (ref 3.5–5.3)
POTASSIUM SERPL-SCNC: 3.8 MMOL/L — SIGNIFICANT CHANGE UP (ref 3.5–5.3)
SODIUM SERPL-SCNC: 132 MMOL/L — LOW (ref 135–145)

## 2022-05-28 PROCEDURE — 99239 HOSP IP/OBS DSCHRG MGMT >30: CPT

## 2022-05-28 RX ORDER — FERROUS SULFATE 325(65) MG
0 TABLET ORAL
Qty: 0 | Refills: 1 | DISCHARGE

## 2022-05-28 RX ORDER — ALPRAZOLAM 0.25 MG
0.25 TABLET ORAL ONCE
Refills: 0 | Status: DISCONTINUED | OUTPATIENT
Start: 2022-05-28 | End: 2022-05-28

## 2022-05-28 RX ORDER — LANOLIN ALCOHOL/MO/W.PET/CERES
3 CREAM (GRAM) TOPICAL AT BEDTIME
Refills: 0 | Status: DISCONTINUED | OUTPATIENT
Start: 2022-05-28 | End: 2022-05-28

## 2022-05-28 RX ORDER — ATENOLOL 25 MG/1
0 TABLET ORAL
Qty: 0 | Refills: 1 | DISCHARGE

## 2022-05-28 RX ORDER — ASPIRIN/CALCIUM CARB/MAGNESIUM 324 MG
1 TABLET ORAL
Qty: 30 | Refills: 0
Start: 2022-05-28 | End: 2022-06-26

## 2022-05-28 RX ORDER — CHLORTHALIDONE 50 MG
0 TABLET ORAL
Qty: 0 | Refills: 5 | DISCHARGE

## 2022-05-28 RX ORDER — HYDRALAZINE HCL 50 MG
1 TABLET ORAL
Qty: 90 | Refills: 0
Start: 2022-05-28 | End: 2022-06-26

## 2022-05-28 RX ORDER — AMLODIPINE BESYLATE 2.5 MG/1
1 TABLET ORAL
Qty: 30 | Refills: 0
Start: 2022-05-28 | End: 2022-06-26

## 2022-05-28 RX ORDER — LOSARTAN/HYDROCHLOROTHIAZIDE 100MG-25MG
0 TABLET ORAL
Qty: 0 | Refills: 1 | DISCHARGE

## 2022-05-28 RX ORDER — LOSARTAN POTASSIUM 100 MG/1
1 TABLET, FILM COATED ORAL
Qty: 0 | Refills: 0 | DISCHARGE

## 2022-05-28 RX ADMIN — Medication 81 MILLIGRAM(S): at 11:11

## 2022-05-28 RX ADMIN — SODIUM CHLORIDE 75 MILLILITER(S): 9 INJECTION INTRAMUSCULAR; INTRAVENOUS; SUBCUTANEOUS at 08:33

## 2022-05-28 RX ADMIN — Medication 0.25 MILLIGRAM(S): at 11:55

## 2022-05-28 RX ADMIN — Medication 325 MILLIGRAM(S): at 11:11

## 2022-05-28 NOTE — DISCHARGE NOTE NURSING/CASE MANAGEMENT/SOCIAL WORK - NSDCPEFALRISK_GEN_ALL_CORE
For information on Fall & Injury Prevention, visit: [email or web address] OR  [email or web address] OR  https://www.cdc.gov/steadi/patient.html

## 2022-05-28 NOTE — DISCHARGE NOTE NURSING/CASE MANAGEMENT/SOCIAL WORK - PATIENT PORTAL LINK FT
You can access the FollowMyHealth Patient Portal offered by [facility] by registering at the following website: [email or web address]. By joining [facility]’s FollowMyHealth portal, you will also be able to view your health information using other applications (apps) compatible with our system.

## 2022-05-28 NOTE — DISCHARGE NOTE PROVIDER - NSDCMRMEDTOKEN_GEN_ALL_CORE_FT
amLODIPine 10 mg oral tablet: 1 tab(s) orally once a day  aspirin 81 mg oral delayed release tablet: 1 tab(s) orally once a day  atorvastatin 40 mg oral tablet: 1 tab(s) orally once a day (at bedtime)  ferrous fumarate 325 mg (106 mg elemental iron) oral tablet: 1 tab(s) orally once a day   hydrALAZINE 100 mg oral tablet: 1 tab(s) orally 3 times a day

## 2022-05-28 NOTE — DISCHARGE NOTE PROVIDER - NSFOLLOWUPCLINICS_GEN_ALL_ED_FT
[facility]  Hematology/Oncology  [address] [id number]  Phone: [phone]  Fax:

## 2022-05-28 NOTE — DISCHARGE NOTE PROVIDER - HOSPITAL COURSE
75 y/o F w/ PMHx of HTN (uncontrolled), HLD sent in by PCP for hyponatremia likely secondary to new medications    Hyponatremia  - Medication induced, pt admits of also drinking water all day  - Urine lytes noted   - discontinued Chlorthalidone  - Improved  - Need oupt follow up with repeat lab within 1 wk.    Uncontrolled HTN  - Pt w/ significant Polypharmacy; unclear what she is actively taking  - pt w/ noted Bradycardia; d/richa Atenolol  - w/ Hydralazine 100 TID  - Discontinued Chlorthalidone, Hold  Losartan for worsening cr ( was recently changed from HCTZ - Losartan )  - c/w Amlodipine  - BP acceptable now    HLD  - c/w statin    Anemia  - Microcytic anemia  - noted initially on ED visit last month, guaiac neg at the time  - no reported BRBPR or melena  - On PO iron, not iron deficient in iron profile  - outpt hematology eval      ROSA:  - Resolved after IVF  - Renal US with no hydro    DVT ppx - low risk, SCDs

## 2022-05-28 NOTE — DISCHARGE NOTE PROVIDER - CARE PROVIDER_API CALL
Keep wound dry for 24 hours  Keep wound clean  Have stitches removed in 10 days  Return here for redness, swelling, pus, pain, discharge or any other concerns   Reinaldo Mcnair  INTERNAL MEDICINE  [address] [facility], [address] [place] [id number]  Phone: [phone]  Fax: [phone]  Follow Up Time:     pcp,   Phone: (   )    -  Fax: (   )    -  Follow Up Time:

## 2022-05-28 NOTE — DISCHARGE NOTE PROVIDER - NSDCCPCAREPLAN_GEN_ALL_CORE_FT
PRINCIPAL DISCHARGE DIAGNOSIS  Diagnosis: Hyponatremia  Assessment and Plan of Treatment: 75 y/o F w/ PMHx of HTN (uncontrolled), HLD sent in by PCP for hyponatremia likely secondary to new medications  Hyponatremia  - Medication induced, pt admits of also drinking water all day  - Urine lytes noted   - discontinued Chlorthalidone  - Improved  - Need oupt follow up with repeat lab within 1 wk.  Uncontrolled HTN  - Pt w/ significant Polypharmacy; unclear what she is actively taking  - pt w/ noted Bradycardia; d/richa Atenolol  - w/ Hydralazine 100 TID  - Discontinued Chlorthalidone, Hold  Losartan for worsening cr ( was recently changed from HCTZ - Losartan )  - c/w Amlodipine  - BP acceptable now  HLD  - c/w statin  Anemia  - Microcytic anemia  - noted initially on ED visit last month, guaiac neg at the time  - no reported BRBPR or melena  - On PO iron, not iron deficient in iron profile  - outpt hematology eval    ROSA:  - Resolved after IVF  - Renal US with no hydro  DVT ppx - low risk, SCDs        SECONDARY DISCHARGE DIAGNOSES  Diagnosis: Hypertension  Assessment and Plan of Treatment:

## 2022-05-29 ENCOUNTER — EMERGENCY (EMERGENCY)
Facility: HOSPITAL | Age: 77
LOS: 0 days | Discharge: HOME | End: 2022-05-29
Attending: EMERGENCY MEDICINE
Payer: MEDICARE

## 2022-05-29 VITALS
DIASTOLIC BLOOD PRESSURE: 67 MMHG | HEART RATE: 55 BPM | TEMPERATURE: 98 F | RESPIRATION RATE: 16 BRPM | SYSTOLIC BLOOD PRESSURE: 144 MMHG | OXYGEN SATURATION: 100 %

## 2022-05-29 VITALS
HEART RATE: 50 BPM | RESPIRATION RATE: 16 BRPM | WEIGHT: 141.98 LBS | DIASTOLIC BLOOD PRESSURE: 64 MMHG | TEMPERATURE: 98 F | SYSTOLIC BLOOD PRESSURE: 164 MMHG | HEIGHT: 66 IN | OXYGEN SATURATION: 100 %

## 2022-05-29 DIAGNOSIS — F17.200 NICOTINE DEPENDENCE, UNSPECIFIED, UNCOMPLICATED: ICD-10-CM

## 2022-05-29 DIAGNOSIS — N17.9 ACUTE KIDNEY FAILURE, UNSPECIFIED: ICD-10-CM

## 2022-05-29 DIAGNOSIS — E87.1 HYPO-OSMOLALITY AND HYPONATREMIA: ICD-10-CM

## 2022-05-29 DIAGNOSIS — I10 ESSENTIAL (PRIMARY) HYPERTENSION: ICD-10-CM

## 2022-05-29 DIAGNOSIS — Z79.82 LONG TERM (CURRENT) USE OF ASPIRIN: ICD-10-CM

## 2022-05-29 PROBLEM — E78.5 HYPERLIPIDEMIA, UNSPECIFIED: Chronic | Status: ACTIVE | Noted: 2022-05-25

## 2022-05-29 LAB
ALBUMIN SERPL ELPH-MCNC: 3.4 G/DL — SIGNIFICANT CHANGE UP (ref 3.3–5)
ALP SERPL-CCNC: 74 U/L — SIGNIFICANT CHANGE UP (ref 40–120)
ALT FLD-CCNC: 21 U/L — SIGNIFICANT CHANGE UP (ref 12–78)
ANION GAP SERPL CALC-SCNC: 10 MMOL/L — SIGNIFICANT CHANGE UP (ref 5–17)
ANISOCYTOSIS BLD QL: SLIGHT — SIGNIFICANT CHANGE UP
AST SERPL-CCNC: 21 U/L — SIGNIFICANT CHANGE UP (ref 15–37)
BASOPHILS # BLD AUTO: 0.04 K/UL — SIGNIFICANT CHANGE UP (ref 0–0.2)
BASOPHILS NFR BLD AUTO: 0.9 % — SIGNIFICANT CHANGE UP (ref 0–2)
BILIRUB SERPL-MCNC: 0.8 MG/DL — SIGNIFICANT CHANGE UP (ref 0.2–1.2)
BUN SERPL-MCNC: 23 MG/DL — SIGNIFICANT CHANGE UP (ref 7–23)
CALCIUM SERPL-MCNC: 8.8 MG/DL — SIGNIFICANT CHANGE UP (ref 8.5–10.1)
CHLORIDE SERPL-SCNC: 95 MMOL/L — LOW (ref 96–108)
CO2 SERPL-SCNC: 26 MMOL/L — SIGNIFICANT CHANGE UP (ref 22–31)
CREAT SERPL-MCNC: 1.54 MG/DL — HIGH (ref 0.5–1.3)
DACRYOCYTES BLD QL SMEAR: SLIGHT — SIGNIFICANT CHANGE UP
EGFR: 35 ML/MIN/1.73M2 — LOW
EOSINOPHIL # BLD AUTO: 0.12 K/UL — SIGNIFICANT CHANGE UP (ref 0–0.5)
EOSINOPHIL NFR BLD AUTO: 2.7 % — SIGNIFICANT CHANGE UP (ref 0–6)
GLUCOSE SERPL-MCNC: 87 MG/DL — SIGNIFICANT CHANGE UP (ref 70–99)
HCT VFR BLD CALC: 28.3 % — LOW (ref 34.5–45)
HGB BLD-MCNC: 9.1 G/DL — LOW (ref 11.5–15.5)
HYPOCHROMIA BLD QL: SLIGHT — SIGNIFICANT CHANGE UP
IMM GRANULOCYTES NFR BLD AUTO: 0.01 % — SIGNIFICANT CHANGE UP (ref 0–1.5)
LYMPHOCYTES # BLD AUTO: 0.87 K/UL — LOW (ref 1–3.3)
LYMPHOCYTES # BLD AUTO: 19.6 % — SIGNIFICANT CHANGE UP (ref 13–44)
MCHC RBC-ENTMCNC: 23.9 PG — LOW (ref 27–34)
MCHC RBC-ENTMCNC: 32.2 G/DL — SIGNIFICANT CHANGE UP (ref 32–36)
MCV RBC AUTO: 74.5 FL — LOW (ref 80–100)
MICROCYTES BLD QL: SLIGHT — SIGNIFICANT CHANGE UP
MONOCYTES # BLD AUTO: 0.29 K/UL — SIGNIFICANT CHANGE UP (ref 0–0.9)
MONOCYTES NFR BLD AUTO: 6.5 % — SIGNIFICANT CHANGE UP (ref 2–14)
NEUTROPHILS # BLD AUTO: 3.11 K/UL — SIGNIFICANT CHANGE UP (ref 1.8–7.4)
NEUTROPHILS NFR BLD AUTO: 70.1 % — SIGNIFICANT CHANGE UP (ref 43–77)
NRBC # BLD: 0 /100 WBCS — SIGNIFICANT CHANGE UP (ref 0–0)
OVALOCYTES BLD QL SMEAR: SLIGHT — SIGNIFICANT CHANGE UP
PLAT MORPH BLD: NORMAL — SIGNIFICANT CHANGE UP
PLATELET # BLD AUTO: 180 K/UL — SIGNIFICANT CHANGE UP (ref 150–400)
POTASSIUM SERPL-MCNC: 3.7 MMOL/L — SIGNIFICANT CHANGE UP (ref 3.5–5.3)
POTASSIUM SERPL-SCNC: 3.7 MMOL/L — SIGNIFICANT CHANGE UP (ref 3.5–5.3)
PROT SERPL-MCNC: 7.4 GM/DL — SIGNIFICANT CHANGE UP (ref 6–8.3)
RBC # BLD: 3.8 M/UL — SIGNIFICANT CHANGE UP (ref 3.8–5.2)
RBC # FLD: 13.7 % — SIGNIFICANT CHANGE UP (ref 10.3–14.5)
RBC BLD AUTO: ABNORMAL
SODIUM SERPL-SCNC: 131 MMOL/L — LOW (ref 135–145)
TROPONIN I, HIGH SENSITIVITY RESULT: 7.1 NG/L — SIGNIFICANT CHANGE UP
WBC # BLD: 4.44 K/UL — SIGNIFICANT CHANGE UP (ref 3.8–10.5)
WBC # FLD AUTO: 4.44 K/UL — SIGNIFICANT CHANGE UP (ref 3.8–10.5)

## 2022-05-29 PROCEDURE — 99284 EMERGENCY DEPT VISIT MOD MDM: CPT

## 2022-05-29 PROCEDURE — 71045 X-RAY EXAM CHEST 1 VIEW: CPT | Mod: 26

## 2022-05-29 RX ORDER — SODIUM CHLORIDE 9 MG/ML
1000 INJECTION INTRAMUSCULAR; INTRAVENOUS; SUBCUTANEOUS ONCE
Refills: 0 | Status: DISCONTINUED | OUTPATIENT
Start: 2022-05-29 | End: 2022-05-29

## 2022-05-29 NOTE — ED PROVIDER NOTE - COVID-19 RESULT DATE/TIME
"   07/07/21 1100   Call Information   Date of Call 07/07/21   Time of Call 0940   Name of person requesting the team Elva Alicea  (per phone call from CT \"pt. coughing up blood\")   Title of person requesting team RN   RRT Arrival time 0942   Time RRT ended 1055   Reason for call   Type of RRT Adult   Primary reason for call Uncontrolled excessive bleeding;Respiratory   Respiratory Respiratory pattern change;Rate greater than 24;Other (describe)  (unable to obtain O2 sats)   Was patient transferred from the ED, ICU, or PACU within last 24 hours prior to RRT call? 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, s/p esophageal stent 1/2021, s/p tracheostomy (now removed) and PEG (remains in place) and has completed chemoradiation as of 4/2021 with most recent scans showing likely residual disease, significant malignancy    Notable History/Conditions Cancer   Assessment Sitting on edge of CT table, tachypneic, tripod position, using accessory muscles and abdominal muscles, C/O chest pain, inablility to breath, incresed restlessesness   Interventions CXR;ECG;O2 per N/C or mask;Portable monitor;Suction;Labs;Fluid bolus   Adjustments to Recommend trasnfer to ICU for airway protection and intubation   Patient Outcome   Patient Outcome Transferred to  (6D)   RRT Team   Attending/Primary/Covering Physician MD Irvin   Date Attending Physician notified 07/07/21   Time Attending Physician notified 0940   Physician(s) Adali Karimi   Post RRT Intervention Assessment   Post RRT Assessment Stable/Improved   Date Follow Up Done 07/07/21   Time Follow Up Done 1254     "
Rapid Response Called, coughing up bright red blood and now feels shortness of breath, currently obtaining vitals. MD paged.   
25-May-2022 00:24

## 2022-05-29 NOTE — ED PROVIDER NOTE - PATIENT PORTAL LINK FT
You can access the FollowMyHealth Patient Portal offered by [facility] by registering at the following website: [email or web address]. By joining [employer]’s FollowMyHealth portal, you will also be able to view your health information using other applications (apps) compatible with our system.

## 2022-05-29 NOTE — ED PROVIDER NOTE - CLINICAL SUMMARY MEDICAL DECISION MAKING FREE TEXT BOX
Well appearing female recently admitted for hyponatremia, sent to ER due to difficulty with rousability at home.  VSS.  NIHSS 0, patient not having any stroke sx.  Labs reviewed, sodium non critical, renal function tends to fluctuate however near her baseline, PO fluids given prior to discharge since patient is hard stick and declines repeated efforts with IV, advised granddaughter to follow up with patient for nephrology, do not feel she needs to be readmitted.  Discussed results and outcome of today's visit with the patient/family, copy of results given with discharge.  Patient advised to arrange bhardwaj follow up with their PMD and/or any provided referral(s) within the next few days and are cautioned to return to the Emergency Department for any worsening symptoms.  Patient advised that their doctor may call  to follow up on the specific results of the tests performed today in the emergency department.   Patient appears well on discharge. Well appearing female recently admitted for hyponatremia, sent to ER due to difficulty with rousability at home.  VSS.  NIHSS 0, patient not having any stroke sx.  Labs reviewed, sodium non critical, renal function tends to fluctuate however near her baseline, PO fluids given prior to discharge since patient is hard stick and declines repeated efforts with IV, advised granddaughter to follow up with patient for nephrology, continue fluids at home, do not feel she needs to be readmitted.  Discussed results and outcome of today's visit with the patient/family, copy of results given with discharge.  Patient advised to arrange bhardwaj follow up with their PMD and/or any provided referral(s) within the next few days and are cautioned to return to the Emergency Department for any worsening symptoms.  Patient advised that their doctor may call  to follow up on the specific results of the tests performed today in the emergency department.   Patient appears well on discharge.

## 2022-05-29 NOTE — ED PROVIDER NOTE - PHYSICAL EXAMINATION
Gen: Alert, NAD, well appearing  Head: NC, AT, EOMI, normal lids/conjunctiva  ENT: normal hearing, patent oropharynx without erythema/exudate, uvula midline  Neck: +supple, no tenderness/meningismus/JVD, +Trachea midline  Pulm: Bilateral BS, normal resp effort, no wheeze/stridor/retractions  CV: RRR, no M/R/G, +dist pulses  Abd: soft, NT/ND, Negative [place] signs, +BS, no palpable masses  Mskel: no edema/erythema/cyanosis  Skin: no rash, warm/dry  Neuro: AAOx3, no apparent sensory/motor deficits, coordination intact

## 2022-05-29 NOTE — ED ADULT TRIAGE NOTE - CHIEF COMPLAINT QUOTE
As per EMS pt was found seated in a chair not responding x 2100. EMS states pt found breathing but not responding to stimuli responded after doing finger stick. Pt in triage states she wants to go home.

## 2022-05-29 NOTE — ED PROVIDER NOTE - OBJECTIVE STATEMENT
Pertinent PMH/PSH/FHx/SHx and Review of Systems contained within:  Patient presents to the ED for medical evaluation.  Patient p/w granddaughter, had been admitted to medicine here for hyponatremia and ROSA, discharged today to home.  She fell asleep in her arm chair and when granddaughter had gone to wake her up, patient was difficult to rouse so 911 was called.  EMS states that when they pricked her finger for glucose check, she jumped up and yelped.  She is alert and oriented in ER without any neurodeficits, states no complaints of headache, dizziness, chest pain, sob.  She is angry about being in the ER again and wants to go home right away.      Relevant PMHx/SHx/SOCHx/FAMH:  Anemia, HTN, HLD  +Smoker, denies use of other illict drugs or h/o alchol abuse    ROS: No fever/chills, No headache/photophobia/eye pain/changes in vision, No ear pain/sore throat/dysphagia, No chest pain/palpitations, no SOB/cough/wheeze/stridor, No abdominal pain, No N/V/D/melena, no dysuria/frequency/discharge, No neck/back pain, no rash, no changes in neurological status/function. Pertinent PMH/PSH/FHx/SHx and Review of Systems contained within:  Patient presents to the ED for medical evaluation.  Patient p/w granddaughter, had been admitted to medicine here for hyponatremia and ROSA, discharged today to home.  She fell asleep in her arm chair and when granddaughter had gone to wake her up, patient was difficult to rouse so 911 was called.  EMS states that when they pricked her finger for glucose check, she jumped up and yelped.  She is alert and oriented in ER without any neurodeficits, states no complaints of headache, dizziness, chest pain, sob.  She is angry about being in the ER again and wants to go home right away, says that she had slept poorly in the hospital.       Relevant PMHx/SHx/SOCHx/FAMH:  Anemia, HTN, HLD  +Smoker, denies use of other illict drugs or h/o alchol abuse    ROS: No fever/chills, No headache/photophobia/eye pain/changes in vision, No ear pain/sore throat/dysphagia, No chest pain/palpitations, no SOB/cough/wheeze/stridor, No abdominal pain, No N/V/D/melena, no dysuria/frequency/discharge, No neck/back pain, no rash, no changes in neurological status/function.

## 2022-06-01 LAB
CORTICOSTEROID BINDING GLOBULIN RESULT: 1.8 MG/DL — SIGNIFICANT CHANGE UP
CORTIS F/TOTAL MFR SERPL: 32 % — SIGNIFICANT CHANGE UP
CORTIS SERPL-MCNC: 17 UG/DL — SIGNIFICANT CHANGE UP
CORTISOL, FREE RESULT: 5.4 UG/DL — HIGH

## 2022-06-03 DIAGNOSIS — N17.9 ACUTE KIDNEY FAILURE, UNSPECIFIED: ICD-10-CM

## 2022-06-03 DIAGNOSIS — T50.2X5A ADVERSE EFFECT OF CARBONIC-ANHYDRASE INHIBITORS, BENZOTHIADIAZIDES AND OTHER DIURETICS, INITIAL ENCOUNTER: ICD-10-CM

## 2022-06-03 DIAGNOSIS — I16.0 HYPERTENSIVE URGENCY: ICD-10-CM

## 2022-06-03 DIAGNOSIS — Z79.82 LONG TERM (CURRENT) USE OF ASPIRIN: ICD-10-CM

## 2022-06-03 DIAGNOSIS — I10 ESSENTIAL (PRIMARY) HYPERTENSION: ICD-10-CM

## 2022-06-03 DIAGNOSIS — D50.9 IRON DEFICIENCY ANEMIA, UNSPECIFIED: ICD-10-CM

## 2022-06-03 DIAGNOSIS — E78.5 HYPERLIPIDEMIA, UNSPECIFIED: ICD-10-CM

## 2022-06-03 DIAGNOSIS — E87.1 HYPO-OSMOLALITY AND HYPONATREMIA: ICD-10-CM

## 2022-09-23 ENCOUNTER — APPOINTMENT (OUTPATIENT)
Dept: VASCULAR SURGERY | Facility: CLINIC | Age: 77
End: 2022-09-23

## 2022-09-23 VITALS
SYSTOLIC BLOOD PRESSURE: 135 MMHG | HEART RATE: 65 BPM | OXYGEN SATURATION: 97 % | TEMPERATURE: 98.2 F | DIASTOLIC BLOOD PRESSURE: 81 MMHG

## 2022-09-23 PROCEDURE — 99214 OFFICE O/P EST MOD 30 MIN: CPT

## 2022-09-23 NOTE — ASSESSMENT
[FreeTextEntry1] : Patient with a bilateral lower extremity fluid retention and edema with negative venous Dopplers as per the patient.  Patient with a bilateral carotid artery stenosis with a history of CVA in the past.

## 2022-09-23 NOTE — HISTORY OF PRESENT ILLNESS
[FreeTextEntry1] : The patient is can return to the office accompanied by her niece for the follow-up of her carotid artery stenosis.  Patient has a history of for CVA with a 50 to 69% stenosis of the carotid arteries and patient requires a surveillance of the carotid arteries.  The patient also recently had swelling on both lower extremity and has come for evaluation of both lower extremity edema.  The patient's and the family says patient had a Doppler done on both lower extremity and there was negative for blood clots at the doctor's office.  The patient has no new neurological events or CVA or TIA's and has been feeling perfectly fine.  No issues with the vision or dizziness.  The patient's says that she has a hypertension and takes lisinopril and was told that she may be having the swelling of the legs because of that patient also has no fever no chills and no pain in the legs

## 2022-10-12 ENCOUNTER — APPOINTMENT (OUTPATIENT)
Dept: ULTRASOUND IMAGING | Facility: HOSPITAL | Age: 77
End: 2022-10-12

## 2022-10-12 ENCOUNTER — OUTPATIENT (OUTPATIENT)
Dept: OUTPATIENT SERVICES | Facility: HOSPITAL | Age: 77
LOS: 1 days | Discharge: HOME | End: 2022-10-12

## 2022-10-12 ENCOUNTER — RESULT REVIEW (OUTPATIENT)
Age: 77
End: 2022-10-12

## 2022-10-12 DIAGNOSIS — I65.23 OCCLUSION AND STENOSIS OF BILATERAL CAROTID ARTERIES: ICD-10-CM

## 2022-10-12 PROCEDURE — 93880 EXTRACRANIAL BILAT STUDY: CPT | Mod: 26

## 2022-12-02 ENCOUNTER — APPOINTMENT (OUTPATIENT)
Dept: VASCULAR SURGERY | Facility: CLINIC | Age: 77
End: 2022-12-02

## 2022-12-02 VITALS
OXYGEN SATURATION: 95 % | SYSTOLIC BLOOD PRESSURE: 135 MMHG | HEART RATE: 74 BPM | BODY MASS INDEX: 21.92 KG/M2 | WEIGHT: 138 LBS | DIASTOLIC BLOOD PRESSURE: 65 MMHG | HEIGHT: 66.5 IN | TEMPERATURE: 97.3 F

## 2022-12-02 PROCEDURE — 99213 OFFICE O/P EST LOW 20 MIN: CPT

## 2022-12-02 RX ORDER — FERROUS SULFATE TAB EC 325 MG (65 MG FE EQUIVALENT) 325 (65 FE) MG
325 (65 FE) TABLET DELAYED RESPONSE ORAL
Qty: 90 | Refills: 0 | Status: ACTIVE | COMMUNITY
Start: 2022-08-14

## 2022-12-02 RX ORDER — HYDRALAZINE HYDROCHLORIDE 100 MG/1
100 TABLET ORAL
Qty: 90 | Refills: 0 | Status: ACTIVE | COMMUNITY
Start: 2022-10-19

## 2022-12-02 NOTE — DATA REVIEWED
[FreeTextEntry1] : The carotid duplex shows 70 to 99% stenosis of the right internal carotid artery and 50 to 69% stenosis of the left internal carotid artery.  The stenosis is slightly worse than the findings in March 2022 this results are discussed with the patient as well as the patient's granddaughter by telephone

## 2022-12-02 NOTE — ASSESSMENT
[FreeTextEntry1] : I would discussed with the patient the results and I have advised the patient to continue with the aspirin and pleaded with the patient to stop the smoking.  The possibility of carotid endarterectomy also is discussed with the patient and she refuses to even consider any surgical option at this time as she feels fine and she does not want any operations.  I have explained the risk of the CVA risk of stroke in the future TIAs are vision issues and patient understands that but refuses to consider any surgery. [Other: _____] : [unfilled]

## 2022-12-02 NOTE — HISTORY OF PRESENT ILLNESS
[FreeTextEntry1] : The patient has come for the follow-up of her carotid artery stenosis.  The patient has a history of CVA and had carotid Dopplers and CT angio work-up in March 2022.  Patient had a repeat carotid Doppler done in October 2022 and has come to the office for the follow-up.  The patient is by herself but I discussed the findings as well as the care of and the plans with the patient's granddaughter by the phone.  The patient has no new symptoms since the last visit she says she feels fine.  She continues to smoke and has no plans to stop smoking.

## 2023-02-07 NOTE — ED ADULT NURSE NOTE - SUICIDE SCREENING DEPRESSION
Medical Week 2 Survey    Flowsheet Row Responses   [facility] patient discharged from? [place]   Does the patient have one of the following disease processes/diagnoses(primary or secondary)? Other   Week 2 attempt successful? No   Unsuccessful attempts Attempt 2   Revoke Decline to participate  [No answer x 3 consecutive attempts.]          Jud BOONE - Registered Nurse  
Negative

## 2023-03-03 ENCOUNTER — APPOINTMENT (OUTPATIENT)
Dept: VASCULAR SURGERY | Facility: CLINIC | Age: 78
End: 2023-03-03
Payer: MEDICARE

## 2023-03-03 VITALS
TEMPERATURE: 98 F | HEART RATE: 81 BPM | HEIGHT: 66.5 IN | SYSTOLIC BLOOD PRESSURE: 148 MMHG | DIASTOLIC BLOOD PRESSURE: 62 MMHG | BODY MASS INDEX: 21.92 KG/M2 | WEIGHT: 138 LBS | OXYGEN SATURATION: 98 %

## 2023-03-03 DIAGNOSIS — R60.9 EDEMA, UNSPECIFIED: ICD-10-CM

## 2023-03-03 PROCEDURE — 99214 OFFICE O/P EST MOD 30 MIN: CPT

## 2023-03-03 NOTE — HISTORY OF PRESENT ILLNESS
[FreeTextEntry1] : The patient has come for the follow-up of her carotid artery stenosis.  The patient has a history of CVA and had carotid Dopplers and CT angio work-up in March 2022.  Patient had a repeat carotid Doppler done in October 2022 and has come to the office for the follow-up.  The patient is by herself but I discussed the findings as well as the care of and the plans with the patient's granddaughter by the phone.  The patient has no new symptoms since the last visit she says she feels fine.  She continues to smoke and has no plans to stop smoking.\par The patient continues to smoke and also has blurring of vision on and off and no occasional dizziness.\par The patient's history is given by the patient's granddaughter.  The patient is noncompliant with history as well as with exams.  And denies all the symptoms and wants to go home.  She continues to refuse any kind of surgical intervention if needed.  There was no neurologic deficits in upper or lower extremities.

## 2023-03-03 NOTE — ASSESSMENT
[FreeTextEntry1] : I would discussed with the patient the results and I have advised the patient to continue with the aspirin and pleaded with the patient to stop the smoking.  The possibility of carotid endarterectomy also is discussed with the patient and she refuses to even consider any surgical option at this time as she feels fine and she does not want any operations.  I have explained the risk of the CVA risk of stroke in the future TIAs are vision issues and patient understands that but refuses to consider any surgery.\par 3/3/2023 the patient has bilateral carotid artery stenosis more on the right than the left on atorvastatin and no aspirin who has refused all the possible surgical indications if needed.  Despite there is no need for performing any CT angiography at this stage.  She also has experienced no neurologic events or symptoms except for blurring and some dizziness [Other: _____] : [unfilled]

## 2023-03-10 NOTE — DISCHARGE NOTE PROVIDER - ATTENDING DISCHARGE PHYSICAL EXAMINATION:
normal...
Vital Signs Last 24 Hrs  T(C): 36.8 (06 Mar 2022 10:59), Max: 37.1 (06 Mar 2022 04:53)  T(F): 98.2 (06 Mar 2022 10:59), Max: 98.7 (06 Mar 2022 04:53)  HR: 56 (06 Mar 2022 10:59) (49 - 64)  BP: 126/62 (06 Mar 2022 10:59) (126/62 - 170/57)  BP(mean): --  RR: 18 (06 Mar 2022 10:59) (17 - 19)  SpO2: 100% (06 Mar 2022 10:59) (97% - 100%)    PHYSICAL EXAM:  GENERAL: NAD  HEAD:  Atraumatic, Normocephalic  EYES: EOMI, PERRLA, conjunctiva and sclera clear  ENT: O/P Clear  NECK: Supple, No JVD; ? trace R carotid bruit  NERVOUS SYSTEM:  No focal deficits  CHEST/LUNG: Clear to percussion bilaterally; No rales, rhonchi, wheezing  HEART: Regular rate and rhythm; No murmurs, rubs, or gallops  ABDOMEN: Soft, Nontender, Nondistended; Bowel sounds present  EXTREMITIES:  2+ Peripheral Pulses, No clubbing, cyanosis, or edema  SKIN: No rashes or lesions

## 2023-10-06 ENCOUNTER — APPOINTMENT (OUTPATIENT)
Dept: VASCULAR SURGERY | Facility: CLINIC | Age: 78
End: 2023-10-06
Payer: MEDICARE

## 2023-10-06 VITALS
OXYGEN SATURATION: 98 % | WEIGHT: 137 LBS | BODY MASS INDEX: 21.76 KG/M2 | HEIGHT: 66.5 IN | DIASTOLIC BLOOD PRESSURE: 56 MMHG | HEART RATE: 60 BPM | SYSTOLIC BLOOD PRESSURE: 157 MMHG | TEMPERATURE: 97.3 F

## 2023-10-06 DIAGNOSIS — E78.00 PURE HYPERCHOLESTEROLEMIA, UNSPECIFIED: ICD-10-CM

## 2023-10-06 DIAGNOSIS — R55 SYNCOPE AND COLLAPSE: ICD-10-CM

## 2023-10-06 PROCEDURE — 99214 OFFICE O/P EST MOD 30 MIN: CPT

## 2024-01-24 ENCOUNTER — OUTPATIENT (OUTPATIENT)
Dept: OUTPATIENT SERVICES | Facility: HOSPITAL | Age: 79
LOS: 1 days | Discharge: HOME | End: 2024-01-24
Payer: MEDICARE

## 2024-01-24 ENCOUNTER — APPOINTMENT (OUTPATIENT)
Dept: ULTRASOUND IMAGING | Facility: HOSPITAL | Age: 79
End: 2024-01-24

## 2024-01-24 DIAGNOSIS — I65.23 OCCLUSION AND STENOSIS OF BILATERAL CAROTID ARTERIES: ICD-10-CM

## 2024-01-24 PROCEDURE — 93880 EXTRACRANIAL BILAT STUDY: CPT | Mod: 26

## 2024-01-26 ENCOUNTER — APPOINTMENT (OUTPATIENT)
Dept: VASCULAR SURGERY | Facility: CLINIC | Age: 79
End: 2024-01-26
Payer: MEDICARE

## 2024-01-26 VITALS
SYSTOLIC BLOOD PRESSURE: 110 MMHG | DIASTOLIC BLOOD PRESSURE: 68 MMHG | HEART RATE: 66 BPM | TEMPERATURE: 97.1 F | OXYGEN SATURATION: 98 %

## 2024-01-26 DIAGNOSIS — I65.23 OCCLUSION AND STENOSIS OF BILATERAL CAROTID ARTERIES: ICD-10-CM

## 2024-01-26 DIAGNOSIS — M79.604 PAIN IN RIGHT LEG: ICD-10-CM

## 2024-01-26 DIAGNOSIS — F17.200 NICOTINE DEPENDENCE, UNSPECIFIED, UNCOMPLICATED: ICD-10-CM

## 2024-01-26 DIAGNOSIS — I10 ESSENTIAL (PRIMARY) HYPERTENSION: ICD-10-CM

## 2024-01-26 DIAGNOSIS — M79.605 PAIN IN LEFT LEG: ICD-10-CM

## 2024-01-26 PROCEDURE — 99214 OFFICE O/P EST MOD 30 MIN: CPT

## 2024-01-26 NOTE — HISTORY OF PRESENT ILLNESS
[FreeTextEntry1] : This is a 78-year-old female who has come for the follow-up of her carotid artery stenosis and leg edema accompanied by her daughter.  The patient had a history of CVA in the past had undergone arterial workup including carotid for the carotids in the hospital had recovered without any problems and no residual deficits.  The patient underwent follow-up for carotid duplex ultrasound which is reviewed and discussed with the patient.  The patient also has been complaining of some swelling on the legs and some discomfort in the legs.  Patient denies any swelling anymore and the pain in the leg is minimal.  The patient states that she is still continues to smoke.  Patient had no new symptoms of CVA or vision problems since the last office visit

## 2024-01-26 NOTE — DATA REVIEWED
[FreeTextEntry1] : Carotid duplex examination is reviewed and discussed with the patient.  Patient has a 50 to 69% stenosis of internal carotid arteries without any changes from the previous examination bilaterally.

## 2024-01-26 NOTE — ASSESSMENT
[FreeTextEntry1] : Patient with a history of a bilateral carotid artery stenosis 50 to 69% with a history of CVA and TIAs in the past.  Patient has been doing well with the medical management.  In spite of the history patient unfortunately continues to smoke in spite of recommendation advised to stop the smoking.  I have discussed it again with the patient and the daughter and advised them to follow-up with their primary care physician to help her stop smoking.  Patient claims that she only smokes half to 1 cigarette a day at this time.  The risk and complications of smoking and side effects are discussed with the patient and educated

## 2024-01-26 NOTE — PHYSICAL EXAM
[JVD] : no jugular venous distention  [Normal Thyroid] : the thyroid was normal [Normal Breath Sounds] : Normal breath sounds [Carotid Bruits] : no carotid bruits [Normal Heart Sounds] : normal heart sounds [Normal Rate and Rhythm] : normal rate and rhythm [Right Carotid Bruit] : no bruit heard over the right carotid [Left Carotid Bruit] : no bruit heard over the left carotid [Right Femoral Bruit] : no bruit heard over the right femoral artery [Left Femoral Bruit] : no bruit heard over the left femoral artery [1+] : left 1+ [Ankle Swelling (On Exam)] : not present [Varicose Veins Of Lower Extremities] : not present [] : not present [No Rash or Lesion] : No rash or lesion [Purpura] : no purpura  [Petechiae] : no petechiae [Skin Induration] : no induration [Skin Ulcer] : no ulcer [Anxious] : anxious [de-identified] : Well-built well-nourished [de-identified] : Within normal limits [TextEntry] : Patient is examined in sitting and standing position.  Head and neck upper extremity exams are normal with no carotid bruit.  Both lower extremities are with minimal amount of ankle edema and pretibial edema warm feet palpable pedal pulses and no evidence of any acute arterial or venous issues in lower extremities and no open wounds.

## 2024-01-26 NOTE — PLAN
[TextEntry] : The plan at this stage is to follow the patient periodically in the office in nearly duplex scans of the carotids.  Patient will return to the office sooner if there is any problems with the lower extremities or any vascular issues.  Because of patient's lifestyle she continues to be at a high risk for recurrent strokes or vascular issues.

## 2024-01-26 NOTE — REVIEW OF SYSTEMS
[Lower Ext Edema] : lower extremity edema [As Noted in HPI] : as noted in HPI [Negative] : Integumentary

## 2024-05-24 ENCOUNTER — APPOINTMENT (OUTPATIENT)
Dept: VASCULAR SURGERY | Facility: CLINIC | Age: 79
End: 2024-05-24
